# Patient Record
Sex: FEMALE | Race: WHITE | Employment: FULL TIME | URBAN - METROPOLITAN AREA
[De-identification: names, ages, dates, MRNs, and addresses within clinical notes are randomized per-mention and may not be internally consistent; named-entity substitution may affect disease eponyms.]

---

## 2017-03-14 ENCOUNTER — OFFICE VISIT (OUTPATIENT)
Dept: FAMILY MEDICINE CLINIC | Age: 22
End: 2017-03-14
Payer: COMMERCIAL

## 2017-03-14 VITALS
SYSTOLIC BLOOD PRESSURE: 108 MMHG | HEIGHT: 61 IN | HEART RATE: 86 BPM | DIASTOLIC BLOOD PRESSURE: 71 MMHG | RESPIRATION RATE: 16 BRPM | WEIGHT: 143.6 LBS | OXYGEN SATURATION: 98 % | BODY MASS INDEX: 27.11 KG/M2

## 2017-03-14 DIAGNOSIS — L30.9 ECZEMA, UNSPECIFIED TYPE: Primary | ICD-10-CM

## 2017-03-14 PROCEDURE — 99213 OFFICE O/P EST LOW 20 MIN: CPT | Performed by: FAMILY MEDICINE

## 2017-03-14 RX ORDER — CALCIPOTRIENE 50 UG/G
CREAM TOPICAL
Qty: 1 TUBE | Refills: 4 | Status: SHIPPED | OUTPATIENT
Start: 2017-03-14 | End: 2020-08-05

## 2017-08-21 ENCOUNTER — OFFICE VISIT (OUTPATIENT)
Dept: FAMILY MEDICINE CLINIC | Age: 22
End: 2017-08-21
Payer: COMMERCIAL

## 2017-08-21 ENCOUNTER — OFFICE VISIT (OUTPATIENT)
Dept: OBGYN CLINIC | Age: 22
End: 2017-08-21
Payer: COMMERCIAL

## 2017-08-21 VITALS
SYSTOLIC BLOOD PRESSURE: 116 MMHG | WEIGHT: 138 LBS | DIASTOLIC BLOOD PRESSURE: 73 MMHG | BODY MASS INDEX: 26.06 KG/M2 | HEART RATE: 80 BPM | HEIGHT: 61 IN | OXYGEN SATURATION: 96 %

## 2017-08-21 VITALS
WEIGHT: 138.8 LBS | DIASTOLIC BLOOD PRESSURE: 72 MMHG | BODY MASS INDEX: 27.25 KG/M2 | SYSTOLIC BLOOD PRESSURE: 107 MMHG | HEIGHT: 60 IN | HEART RATE: 72 BPM

## 2017-08-21 DIAGNOSIS — D22.71: Primary | ICD-10-CM

## 2017-08-21 DIAGNOSIS — Z30.09 CONTRACEPTIVE EDUCATION: Primary | ICD-10-CM

## 2017-08-21 DIAGNOSIS — Z13.31 POSITIVE DEPRESSION SCREENING: ICD-10-CM

## 2017-08-21 PROCEDURE — 99385 PREV VISIT NEW AGE 18-39: CPT | Performed by: ADVANCED PRACTICE MIDWIFE

## 2017-08-21 PROCEDURE — G8431 POS CLIN DEPRES SCRN F/U DOC: HCPCS | Performed by: FAMILY MEDICINE

## 2017-08-21 PROCEDURE — 99213 OFFICE O/P EST LOW 20 MIN: CPT | Performed by: FAMILY MEDICINE

## 2017-08-21 RX ORDER — NORGESTIMATE AND ETHINYL ESTRADIOL 0.25-0.035
1 KIT ORAL DAILY
Qty: 1 PACKET | Refills: 4 | Status: SHIPPED | OUTPATIENT
Start: 2017-08-21 | End: 2019-02-28 | Stop reason: CLARIF

## 2017-08-21 ASSESSMENT — PATIENT HEALTH QUESTIONNAIRE - PHQ9
7. TROUBLE CONCENTRATING ON THINGS, SUCH AS READING THE NEWSPAPER OR WATCHING TELEVISION: 3
SUM OF ALL RESPONSES TO PHQ QUESTIONS 1-9: 12
8. MOVING OR SPEAKING SO SLOWLY THAT OTHER PEOPLE COULD HAVE NOTICED. OR THE OPPOSITE, BEING SO FIGETY OR RESTLESS THAT YOU HAVE BEEN MOVING AROUND A LOT MORE THAN USUAL: 1
10. IF YOU CHECKED OFF ANY PROBLEMS, HOW DIFFICULT HAVE THESE PROBLEMS MADE IT FOR YOU TO DO YOUR WORK, TAKE CARE OF THINGS AT HOME, OR GET ALONG WITH OTHER PEOPLE: 2
4. FEELING TIRED OR HAVING LITTLE ENERGY: 3
5. POOR APPETITE OR OVEREATING: 3
2. FEELING DOWN, DEPRESSED OR HOPELESS: 2
SUM OF ALL RESPONSES TO PHQ9 QUESTIONS 1 & 2: 2
9. THOUGHTS THAT YOU WOULD BE BETTER OFF DEAD, OR OF HURTING YOURSELF: 0

## 2017-10-31 DIAGNOSIS — Z30.09 CONTRACEPTIVE EDUCATION: Primary | ICD-10-CM

## 2017-10-31 RX ORDER — LEVONORGESTREL AND ETHINYL ESTRADIOL 0.15-0.03
1 KIT ORAL DAILY
Qty: 1 PACKET | Refills: 3 | Status: SHIPPED | OUTPATIENT
Start: 2017-10-31 | End: 2017-11-16 | Stop reason: SDUPTHER

## 2017-11-16 DIAGNOSIS — Z30.09 CONTRACEPTIVE EDUCATION: ICD-10-CM

## 2017-11-16 RX ORDER — LEVONORGESTREL AND ETHINYL ESTRADIOL 0.15-0.03
1 KIT ORAL DAILY
Qty: 3 PACKET | Refills: 3 | Status: SHIPPED | OUTPATIENT
Start: 2017-11-16 | End: 2018-11-22 | Stop reason: SDUPTHER

## 2017-12-30 DIAGNOSIS — L30.9 ECZEMA, UNSPECIFIED TYPE: Primary | ICD-10-CM

## 2017-12-30 RX ORDER — PIMECROLIMUS 10 MG/G
CREAM TOPICAL
Qty: 100 G | Refills: 1 | Status: ON HOLD | OUTPATIENT
Start: 2017-12-30 | End: 2020-11-26

## 2018-01-11 ENCOUNTER — TELEPHONE (OUTPATIENT)
Dept: FAMILY MEDICINE CLINIC | Age: 23
End: 2018-01-11

## 2018-05-03 ENCOUNTER — OFFICE VISIT (OUTPATIENT)
Dept: FAMILY MEDICINE CLINIC | Age: 23
End: 2018-05-03
Payer: COMMERCIAL

## 2018-05-03 VITALS
WEIGHT: 140 LBS | RESPIRATION RATE: 16 BRPM | TEMPERATURE: 99.1 F | HEART RATE: 79 BPM | BODY MASS INDEX: 27.48 KG/M2 | HEIGHT: 60 IN | SYSTOLIC BLOOD PRESSURE: 132 MMHG | DIASTOLIC BLOOD PRESSURE: 83 MMHG

## 2018-05-03 DIAGNOSIS — J06.9 VIRAL URI: ICD-10-CM

## 2018-05-03 DIAGNOSIS — J02.9 SORE THROAT: Primary | ICD-10-CM

## 2018-05-03 DIAGNOSIS — R49.0 HOARSENESS OF VOICE: ICD-10-CM

## 2018-05-03 LAB — S PYO AG THROAT QL: NORMAL

## 2018-05-03 PROCEDURE — 87880 STREP A ASSAY W/OPTIC: CPT | Performed by: FAMILY MEDICINE

## 2018-05-03 PROCEDURE — 99213 OFFICE O/P EST LOW 20 MIN: CPT | Performed by: FAMILY MEDICINE

## 2018-05-03 RX ORDER — ARIPIPRAZOLE 2 MG/1
TABLET ORAL
COMMUNITY
Start: 2018-02-16 | End: 2019-09-25

## 2018-05-03 RX ORDER — LAMOTRIGINE 25 MG/1
150 TABLET ORAL
COMMUNITY
Start: 2018-04-20 | End: 2019-02-28 | Stop reason: DRUGHIGH

## 2018-05-03 RX ORDER — AZITHROMYCIN 250 MG/1
TABLET, FILM COATED ORAL
Qty: 6 TABLET | Refills: 0 | Status: SHIPPED | OUTPATIENT
Start: 2018-05-03 | End: 2018-05-13

## 2018-11-22 DIAGNOSIS — Z30.09 CONTRACEPTIVE EDUCATION: ICD-10-CM

## 2018-11-23 RX ORDER — LEVONORGESTREL AND ETHINYL ESTRADIOL 0.15-0.03
KIT ORAL
Qty: 91 TABLET | Refills: 2 | Status: SHIPPED | OUTPATIENT
Start: 2018-11-23 | End: 2019-09-20 | Stop reason: SDUPTHER

## 2019-02-28 ENCOUNTER — OFFICE VISIT (OUTPATIENT)
Dept: FAMILY MEDICINE CLINIC | Age: 24
End: 2019-02-28
Payer: COMMERCIAL

## 2019-02-28 VITALS
HEIGHT: 60 IN | DIASTOLIC BLOOD PRESSURE: 72 MMHG | TEMPERATURE: 97 F | OXYGEN SATURATION: 99 % | BODY MASS INDEX: 27.68 KG/M2 | WEIGHT: 141 LBS | HEART RATE: 89 BPM | SYSTOLIC BLOOD PRESSURE: 118 MMHG

## 2019-02-28 DIAGNOSIS — Z23 NEED FOR MENINGOCOCCAL VACCINATION: ICD-10-CM

## 2019-02-28 DIAGNOSIS — R09.82 POSTNASAL DRIP: Primary | ICD-10-CM

## 2019-02-28 DIAGNOSIS — Z23 NEED FOR TDAP VACCINATION: ICD-10-CM

## 2019-02-28 DIAGNOSIS — S83.421A SPRAIN OF LATERAL COLLATERAL LIGAMENT OF RIGHT KNEE, INITIAL ENCOUNTER: ICD-10-CM

## 2019-02-28 PROCEDURE — 90471 IMMUNIZATION ADMIN: CPT | Performed by: FAMILY MEDICINE

## 2019-02-28 PROCEDURE — 90734 MENACWYD/MENACWYCRM VACC IM: CPT | Performed by: FAMILY MEDICINE

## 2019-02-28 PROCEDURE — 99213 OFFICE O/P EST LOW 20 MIN: CPT | Performed by: FAMILY MEDICINE

## 2019-02-28 RX ORDER — CETIRIZINE HYDROCHLORIDE 10 MG/1
10 TABLET ORAL DAILY
Qty: 30 TABLET | Refills: 5 | Status: SHIPPED | OUTPATIENT
Start: 2019-02-28 | End: 2019-08-31 | Stop reason: SDUPTHER

## 2019-02-28 RX ORDER — MOMETASONE FUROATE 50 UG/1
2 SPRAY, METERED NASAL DAILY
Qty: 1 INHALER | Refills: 3 | Status: SHIPPED | OUTPATIENT
Start: 2019-02-28 | End: 2019-09-25

## 2019-02-28 RX ORDER — LAMOTRIGINE 200 MG/1
150 TABLET ORAL DAILY
Status: ON HOLD | COMMUNITY
Start: 2019-02-26 | End: 2020-11-26

## 2019-02-28 RX ORDER — MULTIVIT-MIN/IRON/FOLIC ACID/K 18-600-40
1 CAPSULE ORAL DAILY
Status: ON HOLD | COMMUNITY
End: 2020-11-26

## 2019-02-28 ASSESSMENT — PATIENT HEALTH QUESTIONNAIRE - PHQ9
2. FEELING DOWN, DEPRESSED OR HOPELESS: 0
SUM OF ALL RESPONSES TO PHQ9 QUESTIONS 1 & 2: 0
SUM OF ALL RESPONSES TO PHQ QUESTIONS 1-9: 0
1. LITTLE INTEREST OR PLEASURE IN DOING THINGS: 0
SUM OF ALL RESPONSES TO PHQ QUESTIONS 1-9: 0

## 2019-03-04 ENCOUNTER — TELEPHONE (OUTPATIENT)
Dept: FAMILY MEDICINE CLINIC | Age: 24
End: 2019-03-04

## 2019-03-05 RX ORDER — OSELTAMIVIR PHOSPHATE 75 MG/1
75 CAPSULE ORAL DAILY
Qty: 7 CAPSULE | Refills: 0 | Status: SHIPPED | OUTPATIENT
Start: 2019-03-05 | End: 2019-03-12

## 2019-03-06 RX ORDER — AMOXICILLIN 500 MG/1
500 CAPSULE ORAL 2 TIMES DAILY
Qty: 10 CAPSULE | Refills: 0 | Status: SHIPPED | OUTPATIENT
Start: 2019-03-06 | End: 2019-03-11

## 2019-07-12 ENCOUNTER — TELEPHONE (OUTPATIENT)
Dept: FAMILY MEDICINE CLINIC | Age: 24
End: 2019-07-12

## 2019-07-12 DIAGNOSIS — R14.0 BLOATING: Primary | ICD-10-CM

## 2019-07-12 DIAGNOSIS — R14.3 EXCESSIVE GAS: ICD-10-CM

## 2019-08-31 DIAGNOSIS — R09.82 POSTNASAL DRIP: ICD-10-CM

## 2019-09-03 RX ORDER — CETIRIZINE HYDROCHLORIDE 10 MG/1
TABLET ORAL
Qty: 30 TABLET | Refills: 4 | Status: SHIPPED | OUTPATIENT
Start: 2019-09-03 | End: 2020-02-13

## 2019-09-17 ENCOUNTER — TELEPHONE (OUTPATIENT)
Dept: FAMILY MEDICINE CLINIC | Age: 24
End: 2019-09-17

## 2019-09-20 DIAGNOSIS — Z30.09 CONTRACEPTIVE EDUCATION: ICD-10-CM

## 2019-09-20 RX ORDER — LEVONORGESTREL AND ETHINYL ESTRADIOL 0.15-0.03
1 KIT ORAL DAILY
Qty: 91 TABLET | Refills: 2 | Status: SHIPPED | OUTPATIENT
Start: 2019-09-20 | End: 2020-06-08

## 2019-09-25 ENCOUNTER — OFFICE VISIT (OUTPATIENT)
Dept: FAMILY MEDICINE CLINIC | Age: 24
End: 2019-09-25
Payer: COMMERCIAL

## 2019-09-25 VITALS
DIASTOLIC BLOOD PRESSURE: 89 MMHG | WEIGHT: 130 LBS | SYSTOLIC BLOOD PRESSURE: 122 MMHG | OXYGEN SATURATION: 99 % | TEMPERATURE: 97.9 F | BODY MASS INDEX: 25.39 KG/M2 | HEART RATE: 96 BPM

## 2019-09-25 DIAGNOSIS — K50.10 CROHN'S DISEASE OF COLON WITHOUT COMPLICATION (HCC): Primary | ICD-10-CM

## 2019-09-25 DIAGNOSIS — Z23 NEED FOR PNEUMOCOCCAL VACCINATION: ICD-10-CM

## 2019-09-25 DIAGNOSIS — Z23 NEED FOR HPV VACCINATION: ICD-10-CM

## 2019-09-25 DIAGNOSIS — Z23 NEED FOR INFLUENZA VACCINATION: ICD-10-CM

## 2019-09-25 DIAGNOSIS — Z23 NEED FOR MENINGOCOCCAL VACCINATION: ICD-10-CM

## 2019-09-25 PROCEDURE — 99213 OFFICE O/P EST LOW 20 MIN: CPT | Performed by: FAMILY MEDICINE

## 2019-09-25 PROCEDURE — 90471 IMMUNIZATION ADMIN: CPT | Performed by: FAMILY MEDICINE

## 2019-09-25 PROCEDURE — 90686 IIV4 VACC NO PRSV 0.5 ML IM: CPT | Performed by: FAMILY MEDICINE

## 2019-09-25 PROCEDURE — 90472 IMMUNIZATION ADMIN EACH ADD: CPT | Performed by: FAMILY MEDICINE

## 2019-09-25 PROCEDURE — 90734 MENACWYD/MENACWYCRM VACC IM: CPT | Performed by: FAMILY MEDICINE

## 2019-09-25 RX ORDER — AZATHIOPRINE 50 MG/1
50 TABLET ORAL DAILY
Status: ON HOLD | COMMUNITY
Start: 2019-09-17 | End: 2020-11-26

## 2019-09-25 RX ORDER — BUDESONIDE 3 MG/1
CAPSULE, COATED PELLETS ORAL
Status: ON HOLD | COMMUNITY
Start: 2019-09-17 | End: 2020-06-01 | Stop reason: HOSPADM

## 2019-10-11 ENCOUNTER — HOSPITAL ENCOUNTER (EMERGENCY)
Facility: CLINIC | Age: 24
Discharge: HOME | End: 2019-10-11
Attending: EMERGENCY MEDICINE
Payer: COMMERCIAL

## 2019-10-11 VITALS
HEART RATE: 84 BPM | SYSTOLIC BLOOD PRESSURE: 117 MMHG | RESPIRATION RATE: 18 BRPM | OXYGEN SATURATION: 98 % | DIASTOLIC BLOOD PRESSURE: 84 MMHG | TEMPERATURE: 98.3 F

## 2019-10-11 DIAGNOSIS — S40.211A ABRASION OF RIGHT SHOULDER, INITIAL ENCOUNTER: ICD-10-CM

## 2019-10-11 DIAGNOSIS — S00.83XA CONTUSION OF FOREHEAD, INITIAL ENCOUNTER: ICD-10-CM

## 2019-10-11 DIAGNOSIS — S09.90XA CLOSED HEAD INJURY, INITIAL ENCOUNTER: Primary | ICD-10-CM

## 2019-10-11 PROCEDURE — 99282 EMERGENCY DEPT VISIT SF MDM: CPT

## 2019-10-11 ASSESSMENT — PAIN DESCRIPTION - PAIN TYPE: TYPE: ACUTE PAIN

## 2019-10-11 ASSESSMENT — PAIN DESCRIPTION - LOCATION: LOCATION: HEAD

## 2019-10-11 ASSESSMENT — PAIN SCALES - GENERAL: PAINLEVEL_OUTOF10: 4

## 2019-10-24 ENCOUNTER — NURSE ONLY (OUTPATIENT)
Dept: FAMILY MEDICINE CLINIC | Age: 24
End: 2019-10-24
Payer: COMMERCIAL

## 2019-10-24 DIAGNOSIS — Z23 NEED FOR HEPATITIS B VACCINATION: Primary | ICD-10-CM

## 2019-10-24 DIAGNOSIS — Z23 NEED FOR HPV VACCINATION: ICD-10-CM

## 2019-10-24 DIAGNOSIS — Z23 NEED FOR PNEUMOCOCCAL VACCINATION: ICD-10-CM

## 2019-10-24 PROCEDURE — 90471 IMMUNIZATION ADMIN: CPT | Performed by: FAMILY MEDICINE

## 2019-10-24 PROCEDURE — 90746 HEPB VACCINE 3 DOSE ADULT IM: CPT | Performed by: FAMILY MEDICINE

## 2019-10-24 PROCEDURE — 90472 IMMUNIZATION ADMIN EACH ADD: CPT | Performed by: FAMILY MEDICINE

## 2019-10-24 PROCEDURE — 90651 9VHPV VACCINE 2/3 DOSE IM: CPT | Performed by: FAMILY MEDICINE

## 2019-11-13 ENCOUNTER — OFFICE VISIT (OUTPATIENT)
Dept: OBGYN CLINIC | Age: 24
End: 2019-11-13
Payer: COMMERCIAL

## 2019-11-13 ENCOUNTER — HOSPITAL ENCOUNTER (OUTPATIENT)
Age: 24
Setting detail: SPECIMEN
Discharge: HOME | End: 2019-11-13
Payer: COMMERCIAL

## 2019-11-13 VITALS
BODY MASS INDEX: 25.36 KG/M2 | WEIGHT: 129.19 LBS | HEART RATE: 73 BPM | HEIGHT: 60 IN | SYSTOLIC BLOOD PRESSURE: 116 MMHG | DIASTOLIC BLOOD PRESSURE: 73 MMHG

## 2019-11-13 DIAGNOSIS — Z11.3 SCREENING EXAMINATION FOR STD (SEXUALLY TRANSMITTED DISEASE): ICD-10-CM

## 2019-11-13 DIAGNOSIS — Z01.419 WELL WOMAN EXAM WITH ROUTINE GYNECOLOGICAL EXAM: Primary | ICD-10-CM

## 2019-11-13 PROCEDURE — 99395 PREV VISIT EST AGE 18-39: CPT | Performed by: OBSTETRICS & GYNECOLOGY

## 2019-11-14 LAB
DIRECT EXAM: NORMAL
Lab: NORMAL
SPECIMEN DESCRIPTION: NORMAL

## 2019-11-15 LAB
C TRACH DNA GENITAL QL NAA+PROBE: NEGATIVE
N. GONORRHOEAE DNA: NEGATIVE
SPECIMEN DESCRIPTION: NORMAL

## 2019-11-19 LAB — CYTOLOGY REPORT: NORMAL

## 2019-11-25 ENCOUNTER — NURSE ONLY (OUTPATIENT)
Dept: FAMILY MEDICINE CLINIC | Age: 24
End: 2019-11-25
Payer: COMMERCIAL

## 2019-11-25 DIAGNOSIS — Z23 NEED FOR HEPATITIS B VACCINATION: Primary | ICD-10-CM

## 2019-11-25 PROCEDURE — 90471 IMMUNIZATION ADMIN: CPT | Performed by: FAMILY MEDICINE

## 2019-11-25 PROCEDURE — 90746 HEPB VACCINE 3 DOSE ADULT IM: CPT | Performed by: FAMILY MEDICINE

## 2020-02-13 RX ORDER — CETIRIZINE HYDROCHLORIDE 10 MG/1
TABLET ORAL
Qty: 30 TABLET | Refills: 3 | Status: SHIPPED | OUTPATIENT
Start: 2020-02-13 | End: 2020-03-03 | Stop reason: SDUPTHER

## 2020-03-03 ENCOUNTER — TELEPHONE (OUTPATIENT)
Dept: FAMILY MEDICINE CLINIC | Age: 25
End: 2020-03-03

## 2020-03-03 RX ORDER — CETIRIZINE HYDROCHLORIDE 10 MG/1
10 TABLET ORAL DAILY
Qty: 30 TABLET | Refills: 3 | Status: ON HOLD | OUTPATIENT
Start: 2020-03-03 | End: 2020-11-26

## 2020-03-03 NOTE — TELEPHONE ENCOUNTER
Charmayne Plough is calling to request a refill on the following medication(s):    Last Visit Date (If Applicable):  7/37/3652    Next Visit Date:    Visit date not found    Medication Request:  Requested Prescriptions     Pending Prescriptions Disp Refills    cetirizine (ZYRTEC) 10 MG tablet 30 tablet 3

## 2020-03-27 ENCOUNTER — TELEPHONE (OUTPATIENT)
Dept: FAMILY MEDICINE CLINIC | Age: 25
End: 2020-03-27

## 2020-03-27 NOTE — TELEPHONE ENCOUNTER
Pt job is making her work during this [id number] and pt states she has a compromised immune system and wants to know if she should self isolate and if so she needs a letter from her PCP.

## 2020-04-21 ENCOUNTER — TELEPHONE (OUTPATIENT)
Dept: FAMILY MEDICINE CLINIC | Age: 25
End: 2020-04-21

## 2020-04-21 NOTE — TELEPHONE ENCOUNTER
Patient calling because she was taking off work until April 27,2020 patient states she would like know if she should refrain from work longer due to the covid and would like a new letter please advise.

## 2020-04-22 ENCOUNTER — TELEPHONE (OUTPATIENT)
Dept: FAMILY MEDICINE CLINIC | Age: 25
End: 2020-04-22

## 2020-04-22 ENCOUNTER — TELEMEDICINE (OUTPATIENT)
Dept: FAMILY MEDICINE CLINIC | Age: 25
End: 2020-04-22
Payer: COMMERCIAL

## 2020-04-22 PROBLEM — K50.90 CROHN'S DISEASE WITHOUT COMPLICATION (HCC): Status: ACTIVE | Noted: 2020-04-22

## 2020-04-22 PROCEDURE — 99213 OFFICE O/P EST LOW 20 MIN: CPT | Performed by: FAMILY MEDICINE

## 2020-04-22 NOTE — LETTER
[address]   [place] [address] [address]  Phone: [phone]  Fax: [phone]    Faustina Cheema MD        April 23, 2020      Ji Moreno, JNX:[birth year], is under my medical care. Due to the COVID-19 pandemic, I recommend she stay home from work due to a compromised immune system. She may return to work on 05/25/2020. If you have any questions please contact my office at the number above.        Sincerely,        Zoe Jovel CMA-Darrin Cheema MD

## 2020-04-22 NOTE — PROGRESS NOTES
General FM note    Dread Mercado is a 27 y.o. female who presents today for follow up on her  medical conditions as noted below. Dread Mercado is c/o of No chief complaint on file. Patient Active Problem List:     Contraceptive education     Eczema     Past Medical History:   Diagnosis Date    Anxiety     Crohn's disease of ileum ([address]     Depression     Seasonal allergies       Past Surgical History:   Procedure Laterality Date    COLONOSCOPY  09/16/2019     Family History   Problem Relation Age of Onset    Bipolar Disorder Mother     Other Mother     Alcohol Abuse Mother     Alcohol Abuse Maternal Aunt     Bipolar Disorder Maternal Grandfather     Diabetes Maternal Grandfather     Alcohol Abuse Maternal Grandfather     High Blood Pressure Maternal Grandfather     Stroke Maternal Grandfather     Miscarriages / Stillbirths Neg Hx     Coronary Art Dis Neg Hx     Colon Cancer Neg Hx     Uterine Cancer Neg Hx     Ovarian Cancer Neg Hx     Breast Cancer Neg Hx      Current Outpatient Medications   Medication Sig Dispense Refill    cetirizine (ZYRTEC) 10 MG tablet Take 1 tablet by mouth daily 30 tablet 3    azaTHIOprine (IMURAN) 50 MG tablet       budesonide (ENTOCORT EC) 3 MG extended release capsule       levonorgestrel-ethinyl estradiol (INTROVALE) 0.15-0.03 MG per tablet Take 1 tablet by mouth daily 91 tablet 2    lamoTRIgine (LAMICTAL) 150 MG tablet 200 mg       Cholecalciferol (VITAMIN D) 2000 units CAPS capsule Take by mouth daily      Multiple Vitamins-Minerals (MULTIVITAMIN PO) Take by mouth      pimecrolimus (ELIDEL) 1 % cream Apply topically 2 times daily. 100 g 1    NONFORMULARY daily Indications: Flouride Toothpaste  - RX      calcipotriene (DOVONEX) 0.005 % cream Apply topically 2 times daily. (Patient not taking: Reported on 11/13/2019) 1 Tube 4    hydrocortisone 2.5 % cream Apply topically 2 times daily.  20 Tube 3     No current facility-administered medications for this visit. ALLERGIES:  No Known Allergies    Social History     Tobacco Use    Smoking status: Never Smoker    Smokeless tobacco: Never Used   Substance Use Topics    Alcohol use: No     Alcohol/week: 0.0 standard drinks      There is no height or weight on file to calculate BMI. There were no vitals taken for this visit. Subjective:      HPI    28 yo female reaching out per tele med visit. 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 Is on Humara and Imuran -- Crohn's. Feels that she is not safe going back. Next jonathan with GI: in 08/2020. Has some co-workers which are infected with COVID 23. Has been home. Needs to have a letter for 4 weeks. Review of Systems   Pertinent items are noted in HPI. Objective:   Physical Exam  General appearance: normal development, habitus and attention, no deformities. No distress. Pulmo: normal breathing pattern. Psychiatric: alert and oriented to place, time and person. Normal mood and affect. Assessment:       Diagnosis Orders   1. Crohn's disease without complication, unspecified gastrointestinal tract location ([facility]     2. Immunosuppressed status ([facility]         Plan:   Pt is on immunosuppressing meds. Needs to stay home. She works in a clinic environment where she could get easily infected. Call or return to clinic prn if these symptoms worsen or fail to improve as anticipated. I have reviewed the instructions with the patient, answering all questions to her satisfaction. Faheem Ramirez is a 27 y.o. female being evaluated by a Virtual Visit (video visit) encounter to address concerns as mentioned above. A caregiver was present when appropriate. Due to this being a TeleHealth encounter (During AHAZV-08 public health emergency), evaluation of the following organ systems was limited: Vitals/Constitutional/EENT/Resp/CV/GI//MS/Neuro/Skin/Heme-Lymph-Imm.   Pursuant to the emergency declaration under the [address]

## 2020-05-11 ENCOUNTER — PATIENT MESSAGE (OUTPATIENT)
Dept: FAMILY MEDICINE CLINIC | Age: 25
End: 2020-05-11

## 2020-05-12 ENCOUNTER — TELEPHONE (OUTPATIENT)
Dept: FAMILY MEDICINE CLINIC | Age: 25
End: 2020-05-12

## 2020-05-12 ENCOUNTER — E-VISIT (OUTPATIENT)
Dept: FAMILY MEDICINE CLINIC | Age: 25
End: 2020-05-12
Payer: COMMERCIAL

## 2020-05-12 PROCEDURE — 99421 OL DIG E/M SVC 5-10 MIN: CPT | Performed by: FAMILY MEDICINE

## 2020-05-12 RX ORDER — BACITRACIN 500 [USP'U]/G
OINTMENT OPHTHALMIC
Qty: 3.5 G | Refills: 1 | Status: SHIPPED | OUTPATIENT
Start: 2020-05-12 | End: 2020-05-13

## 2020-05-12 NOTE — PROGRESS NOTES
Patient is reaching out today because of a rash which she started to have 3 weeks ago. That comes and goes sometimes itches it is on her cheeks bridge of her nose forehead. She started a different soap but even after stopping the soap the rash still persisted. Gets worse when she is in the shower or when she puts any soap or moisturizer on it. Never had a similar rash before. I will call in a topical.  We will treated like a staph infection but I also will order additional blood work including the lupus work-up. The patient has a history of Crohn's disease. Treated by a gastroenterologist.  Please asked the gastroenterologist if this could be a side effect from the current medication but it is not. I called the patient and discussed with her my diagnoses including staph infection but also more so discoid erythema which would lead to lupus diagnosis. Blood work was ordered.

## 2020-05-12 NOTE — TELEPHONE ENCOUNTER
Patient calling because she has rash on her and nose, patient is requesting medication or options over the counter medication she can buy.  Please Advise

## 2020-05-13 ENCOUNTER — TELEPHONE (OUTPATIENT)
Dept: FAMILY MEDICINE CLINIC | Age: 25
End: 2020-05-13

## 2020-05-20 ENCOUNTER — TELEMEDICINE (OUTPATIENT)
Dept: FAMILY MEDICINE CLINIC | Age: 25
End: 2020-05-20
Payer: COMMERCIAL

## 2020-05-20 ENCOUNTER — TELEPHONE (OUTPATIENT)
Dept: FAMILY MEDICINE CLINIC | Age: 25
End: 2020-05-20

## 2020-05-20 PROCEDURE — 99213 OFFICE O/P EST LOW 20 MIN: CPT | Performed by: FAMILY MEDICINE

## 2020-05-21 ASSESSMENT — PATIENT HEALTH QUESTIONNAIRE - PHQ9
SUM OF ALL RESPONSES TO PHQ QUESTIONS 1-9: 0
2. FEELING DOWN, DEPRESSED OR HOPELESS: 0
1. LITTLE INTEREST OR PLEASURE IN DOING THINGS: 0
SUM OF ALL RESPONSES TO PHQ QUESTIONS 1-9: 0
SUM OF ALL RESPONSES TO PHQ9 QUESTIONS 1 & 2: 0

## 2020-05-30 ENCOUNTER — APPOINTMENT (OUTPATIENT)
Dept: CT IMAGING | Facility: CLINIC | Age: 25
End: 2020-05-30
Payer: COMMERCIAL

## 2020-05-30 ENCOUNTER — HOSPITAL ENCOUNTER (EMERGENCY)
Facility: CLINIC | Age: 25
Discharge: ANOTHER HOSPITAL | End: 2020-05-30
Attending: SPECIALIST
Payer: COMMERCIAL

## 2020-05-30 ENCOUNTER — HOSPITAL ENCOUNTER (INPATIENT)
Age: 25
LOS: 2 days | Discharge: HOME | DRG: 386 | End: 2020-06-01
Attending: INTERNAL MEDICINE | Admitting: INTERNAL MEDICINE
Payer: COMMERCIAL

## 2020-05-30 VITALS
HEART RATE: 99 BPM | HEIGHT: 63 IN | OXYGEN SATURATION: 98 % | SYSTOLIC BLOOD PRESSURE: 111 MMHG | TEMPERATURE: 98.2 F | RESPIRATION RATE: 16 BRPM | BODY MASS INDEX: 22.66 KG/M2 | DIASTOLIC BLOOD PRESSURE: 94 MMHG | WEIGHT: 127.87 LBS

## 2020-05-30 PROBLEM — K56.609 SBO (SMALL BOWEL OBSTRUCTION) (HCC): Status: ACTIVE | Noted: 2020-05-30

## 2020-05-30 PROBLEM — K50.919 EXACERBATION OF CROHN'S DISEASE, UNSPECIFIED COMPLICATION (HCC): Status: ACTIVE | Noted: 2020-05-30

## 2020-05-30 LAB
ABSOLUTE EOS #: 0 K/UL (ref 0–0.4)
ABSOLUTE IMMATURE GRANULOCYTE: ABNORMAL K/UL (ref 0–0.3)
ABSOLUTE LYMPH #: 0.6 K/UL (ref 1–4.8)
ABSOLUTE MONO #: 0.6 K/UL (ref 0.1–1.2)
ALBUMIN SERPL-MCNC: 4.5 G/DL (ref 3.5–5.2)
ALBUMIN/GLOBULIN RATIO: 1.4 (ref 1–2.5)
ALP BLD-CCNC: 44 U/L (ref 35–104)
ALT SERPL-CCNC: 14 U/L (ref 5–33)
ANION GAP SERPL CALCULATED.3IONS-SCNC: 13 MMOL/L (ref 9–17)
AST SERPL-CCNC: 17 U/L
BASOPHILS # BLD: 0 % (ref 0–2)
BASOPHILS ABSOLUTE: 0 K/UL (ref 0–0.2)
BILIRUB SERPL-MCNC: 0.5 MG/DL (ref 0.3–1.2)
BILIRUBIN URINE: NEGATIVE
BUN BLDV-MCNC: 14 MG/DL (ref 6–20)
BUN/CREAT BLD: ABNORMAL (ref 9–20)
CALCIUM SERPL-MCNC: 9.8 MG/DL (ref 8.6–10.4)
CHLORIDE BLD-SCNC: 103 MMOL/L (ref 98–107)
CO2: 24 MMOL/L (ref 20–31)
COLOR: YELLOW
COMMENT UA: ABNORMAL
CREAT SERPL-MCNC: 0.6 MG/DL (ref 0.5–0.9)
DIFFERENTIAL TYPE: ABNORMAL
EOSINOPHILS RELATIVE PERCENT: 0 % (ref 1–4)
GFR AFRICAN AMERICAN: >60 ML/MIN
GFR NON-AFRICAN AMERICAN: >60 ML/MIN
GFR SERPL CREATININE-BSD FRML MDRD: ABNORMAL ML/MIN/{1.73_M2}
GFR SERPL CREATININE-BSD FRML MDRD: ABNORMAL ML/MIN/{1.73_M2}
GLUCOSE BLD-MCNC: 125 MG/DL (ref 70–99)
GLUCOSE URINE: NEGATIVE
HCG QUALITATIVE: NEGATIVE
HCT VFR BLD CALC: 45.9 % (ref 36–46)
HEMOGLOBIN: 15.4 G/DL (ref 12–16)
IMMATURE GRANULOCYTES: ABNORMAL %
KETONES, URINE: ABNORMAL
LACTIC ACID: 2.2 MMOL/L (ref 0.5–2.2)
LEUKOCYTE ESTERASE, URINE: NEGATIVE
LIPASE: 28 U/L (ref 13–60)
LYMPHOCYTES # BLD: 4 % (ref 24–44)
MCH RBC QN AUTO: 30.9 PG (ref 26–34)
MCHC RBC AUTO-ENTMCNC: 33.6 G/DL (ref 31–37)
MCV RBC AUTO: 92 FL (ref 80–100)
MONOCYTES # BLD: 4 % (ref 2–11)
NITRITE, URINE: NEGATIVE
NRBC AUTOMATED: ABNORMAL PER 100 WBC
PDW BLD-RTO: 12.7 % (ref 12.5–15.4)
PH UA: 5.5 (ref 5–8)
PLATELET # BLD: 430 K/UL (ref 140–450)
PLATELET ESTIMATE: ABNORMAL
PMV BLD AUTO: 6.6 FL (ref 6–12)
POTASSIUM SERPL-SCNC: 4.5 MMOL/L (ref 3.7–5.3)
PROTEIN UA: NEGATIVE
RBC # BLD: 4.98 M/UL (ref 4–5.2)
RBC # BLD: ABNORMAL 10*6/UL
SEDIMENTATION RATE, ERYTHROCYTE: 19 MM (ref 0–20)
SEG NEUTROPHILS: 92 % (ref 36–66)
SEGMENTED NEUTROPHILS ABSOLUTE COUNT: 16.2 K/UL (ref 1.8–7.7)
SODIUM BLD-SCNC: 140 MMOL/L (ref 135–144)
SPECIFIC GRAVITY UA: 1.1 (ref 1–1.03)
TOTAL PROTEIN: 7.8 G/DL (ref 6.4–8.3)
TURBIDITY: CLEAR
URINE HGB: NEGATIVE
UROBILINOGEN, URINE: NORMAL
WBC # BLD: 17.5 K/UL (ref 3.5–11)
WBC # BLD: ABNORMAL 10*3/UL

## 2020-05-30 PROCEDURE — 99254 IP/OBS CNSLTJ NEW/EST MOD 60: CPT | Performed by: INTERNAL MEDICINE

## 2020-05-30 PROCEDURE — 86256 FLUORESCENT ANTIBODY TITER: CPT

## 2020-05-30 PROCEDURE — 2580000003 HC RX 258: Performed by: SPECIALIST

## 2020-05-30 PROCEDURE — 88346 IMFLUOR 1ST 1ANTB STAIN PX: CPT

## 2020-05-30 PROCEDURE — 6360000002 HC RX W HCPCS: Performed by: INTERNAL MEDICINE

## 2020-05-30 PROCEDURE — 84703 CHORIONIC GONADOTROPIN ASSAY: CPT

## 2020-05-30 PROCEDURE — 81003 URINALYSIS AUTO W/O SCOPE: CPT

## 2020-05-30 PROCEDURE — 83605 ASSAY OF LACTIC ACID: CPT

## 2020-05-30 PROCEDURE — 6360000002 HC RX W HCPCS: Performed by: SPECIALIST

## 2020-05-30 PROCEDURE — 96376 TX/PRO/DX INJ SAME DRUG ADON: CPT

## 2020-05-30 PROCEDURE — APPNB30 APP NON BILLABLE TIME 0-30 MINS: Performed by: NURSE PRACTITIONER

## 2020-05-30 PROCEDURE — 1200000000 HC SEMI PRIVATE

## 2020-05-30 PROCEDURE — 6360000002 HC RX W HCPCS: Performed by: NURSE PRACTITIONER

## 2020-05-30 PROCEDURE — 36415 COLL VENOUS BLD VENIPUNCTURE: CPT

## 2020-05-30 PROCEDURE — 96361 HYDRATE IV INFUSION ADD-ON: CPT

## 2020-05-30 PROCEDURE — 86255 FLUORESCENT ANTIBODY SCREEN: CPT

## 2020-05-30 PROCEDURE — 6370000000 HC RX 637 (ALT 250 FOR IP): Performed by: INTERNAL MEDICINE

## 2020-05-30 PROCEDURE — 88350 IMFLUOR EA ADDL 1ANTB STN PX: CPT

## 2020-05-30 PROCEDURE — 99285 EMERGENCY DEPT VISIT HI MDM: CPT

## 2020-05-30 PROCEDURE — 80053 COMPREHEN METABOLIC PANEL: CPT

## 2020-05-30 PROCEDURE — 81479 UNLISTED MOLECULAR PATHOLOGY: CPT

## 2020-05-30 PROCEDURE — G0379 DIRECT REFER HOSPITAL OBSERV: HCPCS

## 2020-05-30 PROCEDURE — G0378 HOSPITAL OBSERVATION PER HR: HCPCS

## 2020-05-30 PROCEDURE — 96374 THER/PROPH/DIAG INJ IV PUSH: CPT

## 2020-05-30 PROCEDURE — 86140 C-REACTIVE PROTEIN: CPT

## 2020-05-30 PROCEDURE — 85025 COMPLETE CBC W/AUTO DIFF WBC: CPT

## 2020-05-30 PROCEDURE — 74177 CT ABD & PELVIS W/CONTRAST: CPT

## 2020-05-30 PROCEDURE — 96375 TX/PRO/DX INJ NEW DRUG ADDON: CPT

## 2020-05-30 PROCEDURE — 6360000002 HC RX W HCPCS: Performed by: PHYSICIAN ASSISTANT

## 2020-05-30 PROCEDURE — 86671 FUNGUS NES ANTIBODY: CPT

## 2020-05-30 PROCEDURE — 6360000002 HC RX W HCPCS

## 2020-05-30 PROCEDURE — 2580000003 HC RX 258: Performed by: PHYSICIAN ASSISTANT

## 2020-05-30 PROCEDURE — 6360000004 HC RX CONTRAST MEDICATION: Performed by: SPECIALIST

## 2020-05-30 PROCEDURE — 96372 THER/PROPH/DIAG INJ SC/IM: CPT

## 2020-05-30 PROCEDURE — 99223 1ST HOSP IP/OBS HIGH 75: CPT | Performed by: INTERNAL MEDICINE

## 2020-05-30 PROCEDURE — 83690 ASSAY OF LIPASE: CPT

## 2020-05-30 PROCEDURE — 82397 CHEMILUMINESCENT ASSAY: CPT

## 2020-05-30 PROCEDURE — 85651 RBC SED RATE NONAUTOMATED: CPT

## 2020-05-30 PROCEDURE — 83520 IMMUNOASSAY QUANT NOS NONAB: CPT

## 2020-05-30 RX ORDER — SODIUM CHLORIDE 0.9 % (FLUSH) 0.9 %
10 SYRINGE (ML) INJECTION EVERY 12 HOURS SCHEDULED
Status: DISCONTINUED | OUTPATIENT
Start: 2020-05-30 | End: 2020-06-01 | Stop reason: HOSPADM

## 2020-05-30 RX ORDER — PROMETHAZINE HYDROCHLORIDE 25 MG/1
12.5 TABLET ORAL EVERY 6 HOURS PRN
Status: DISCONTINUED | OUTPATIENT
Start: 2020-05-30 | End: 2020-06-01 | Stop reason: HOSPADM

## 2020-05-30 RX ORDER — 0.9 % SODIUM CHLORIDE 0.9 %
1000 INTRAVENOUS SOLUTION INTRAVENOUS ONCE
Status: COMPLETED | OUTPATIENT
Start: 2020-05-30 | End: 2020-05-30

## 2020-05-30 RX ORDER — ONDANSETRON 2 MG/ML
4 INJECTION INTRAMUSCULAR; INTRAVENOUS EVERY 6 HOURS PRN
Status: DISCONTINUED | OUTPATIENT
Start: 2020-05-30 | End: 2020-06-01 | Stop reason: HOSPADM

## 2020-05-30 RX ORDER — ACETAMINOPHEN 325 MG/1
650 TABLET ORAL EVERY 6 HOURS PRN
Status: DISCONTINUED | OUTPATIENT
Start: 2020-05-30 | End: 2020-06-01 | Stop reason: HOSPADM

## 2020-05-30 RX ORDER — LEVONORGESTREL AND ETHINYL ESTRADIOL 0.15-0.03
1 KIT ORAL DAILY
Status: DISCONTINUED | OUTPATIENT
Start: 2020-05-30 | End: 2020-05-31

## 2020-05-30 RX ORDER — DICYCLOMINE HYDROCHLORIDE 10 MG/1
10 CAPSULE ORAL
Status: DISCONTINUED | OUTPATIENT
Start: 2020-05-30 | End: 2020-06-01 | Stop reason: HOSPADM

## 2020-05-30 RX ORDER — 0.9 % SODIUM CHLORIDE 0.9 %
70 INTRAVENOUS SOLUTION INTRAVENOUS ONCE
Status: COMPLETED | OUTPATIENT
Start: 2020-05-30 | End: 2020-05-30

## 2020-05-30 RX ORDER — POLYETHYLENE GLYCOL 3350 17 G/17G
17 POWDER, FOR SOLUTION ORAL DAILY PRN
Status: DISCONTINUED | OUTPATIENT
Start: 2020-05-30 | End: 2020-06-01 | Stop reason: HOSPADM

## 2020-05-30 RX ORDER — SODIUM CHLORIDE 9 MG/ML
INJECTION, SOLUTION INTRAVENOUS CONTINUOUS
Status: DISCONTINUED | OUTPATIENT
Start: 2020-05-30 | End: 2020-06-01 | Stop reason: HOSPADM

## 2020-05-30 RX ORDER — SODIUM CHLORIDE 0.9 % (FLUSH) 0.9 %
10 SYRINGE (ML) INJECTION PRN
Status: DISCONTINUED | OUTPATIENT
Start: 2020-05-30 | End: 2020-05-30 | Stop reason: HOSPADM

## 2020-05-30 RX ORDER — SODIUM CHLORIDE 9 MG/ML
1000 INJECTION, SOLUTION INTRAVENOUS CONTINUOUS
Status: DISCONTINUED | OUTPATIENT
Start: 2020-05-30 | End: 2020-05-30 | Stop reason: HOSPADM

## 2020-05-30 RX ORDER — ONDANSETRON 2 MG/ML
INJECTION INTRAMUSCULAR; INTRAVENOUS
Status: COMPLETED
Start: 2020-05-30 | End: 2020-05-30

## 2020-05-30 RX ORDER — METHYLPREDNISOLONE SODIUM SUCCINATE 40 MG/ML
40 INJECTION, POWDER, LYOPHILIZED, FOR SOLUTION INTRAMUSCULAR; INTRAVENOUS EVERY 12 HOURS
Status: DISCONTINUED | OUTPATIENT
Start: 2020-05-30 | End: 2020-05-31

## 2020-05-30 RX ORDER — AZATHIOPRINE 50 MG/1
50 TABLET ORAL DAILY
Status: DISCONTINUED | OUTPATIENT
Start: 2020-05-30 | End: 2020-06-01 | Stop reason: HOSPADM

## 2020-05-30 RX ORDER — DICYCLOMINE HYDROCHLORIDE 10 MG/1
CAPSULE ORAL
Status: ON HOLD | COMMUNITY
Start: 2019-07-30 | End: 2020-11-26

## 2020-05-30 RX ORDER — ONDANSETRON 2 MG/ML
4 INJECTION INTRAMUSCULAR; INTRAVENOUS ONCE
Status: COMPLETED | OUTPATIENT
Start: 2020-05-30 | End: 2020-05-30

## 2020-05-30 RX ORDER — ACETAMINOPHEN 650 MG/1
650 SUPPOSITORY RECTAL EVERY 6 HOURS PRN
Status: DISCONTINUED | OUTPATIENT
Start: 2020-05-30 | End: 2020-06-01 | Stop reason: HOSPADM

## 2020-05-30 RX ORDER — CALCIPOTRIENE 50 UG/G
CREAM TOPICAL 2 TIMES DAILY
Status: DISCONTINUED | OUTPATIENT
Start: 2020-05-30 | End: 2020-06-01 | Stop reason: HOSPADM

## 2020-05-30 RX ORDER — PIMECROLIMUS 10 MG/G
CREAM TOPICAL 2 TIMES DAILY
Status: DISCONTINUED | OUTPATIENT
Start: 2020-05-30 | End: 2020-06-01 | Stop reason: HOSPADM

## 2020-05-30 RX ORDER — SODIUM CHLORIDE 0.9 % (FLUSH) 0.9 %
10 SYRINGE (ML) INJECTION PRN
Status: DISCONTINUED | OUTPATIENT
Start: 2020-05-30 | End: 2020-06-01 | Stop reason: HOSPADM

## 2020-05-30 RX ORDER — BUDESONIDE 3 MG/1
3 CAPSULE, COATED PELLETS ORAL DAILY
Status: DISCONTINUED | OUTPATIENT
Start: 2020-05-30 | End: 2020-05-30

## 2020-05-30 RX ORDER — MORPHINE SULFATE 2 MG/ML
2 INJECTION, SOLUTION INTRAMUSCULAR; INTRAVENOUS ONCE
Status: COMPLETED | OUTPATIENT
Start: 2020-05-30 | End: 2020-05-30

## 2020-05-30 RX ORDER — LAMOTRIGINE 100 MG/1
200 TABLET ORAL DAILY
Status: DISCONTINUED | OUTPATIENT
Start: 2020-05-30 | End: 2020-06-01 | Stop reason: HOSPADM

## 2020-05-30 RX ADMIN — ONDANSETRON 4 MG: 2 INJECTION INTRAMUSCULAR; INTRAVENOUS at 02:08

## 2020-05-30 RX ADMIN — SODIUM CHLORIDE: 9 INJECTION, SOLUTION INTRAVENOUS at 07:16

## 2020-05-30 RX ADMIN — SODIUM CHLORIDE 1000 ML: 9 INJECTION, SOLUTION INTRAVENOUS at 02:08

## 2020-05-30 RX ADMIN — LAMOTRIGINE 200 MG: 100 TABLET ORAL at 13:18

## 2020-05-30 RX ADMIN — HYDROMORPHONE HYDROCHLORIDE 1 MG: 1 INJECTION, SOLUTION INTRAMUSCULAR; INTRAVENOUS; SUBCUTANEOUS at 04:42

## 2020-05-30 RX ADMIN — AZATHIOPRINE 50 MG: 50 TABLET ORAL at 13:16

## 2020-05-30 RX ADMIN — IOPAMIDOL 75 ML: 755 INJECTION, SOLUTION INTRAVENOUS at 02:43

## 2020-05-30 RX ADMIN — DICYCLOMINE HYDROCHLORIDE 10 MG: 10 CAPSULE ORAL at 17:17

## 2020-05-30 RX ADMIN — SODIUM CHLORIDE: 9 INJECTION, SOLUTION INTRAVENOUS at 13:19

## 2020-05-30 RX ADMIN — Medication 10 ML: at 02:44

## 2020-05-30 RX ADMIN — HYDROMORPHONE HYDROCHLORIDE 1 MG: 1 INJECTION, SOLUTION INTRAMUSCULAR; INTRAVENOUS; SUBCUTANEOUS at 03:20

## 2020-05-30 RX ADMIN — Medication 10 ML: at 09:15

## 2020-05-30 RX ADMIN — METHYLPREDNISOLONE SODIUM SUCCINATE 40 MG: 40 INJECTION, POWDER, FOR SOLUTION INTRAMUSCULAR; INTRAVENOUS at 14:12

## 2020-05-30 RX ADMIN — MORPHINE SULFATE 2 MG: 2 INJECTION, SOLUTION INTRAMUSCULAR; INTRAVENOUS at 02:08

## 2020-05-30 RX ADMIN — SODIUM CHLORIDE 70 ML: 9 INJECTION, SOLUTION INTRAVENOUS at 02:44

## 2020-05-30 RX ADMIN — HYDROMORPHONE HYDROCHLORIDE 1 MG: 1 INJECTION, SOLUTION INTRAMUSCULAR; INTRAVENOUS; SUBCUTANEOUS at 11:43

## 2020-05-30 RX ADMIN — ENOXAPARIN SODIUM 40 MG: 40 INJECTION SUBCUTANEOUS at 11:42

## 2020-05-30 ASSESSMENT — PAIN DESCRIPTION - LOCATION: LOCATION: ABDOMEN;GENERALIZED

## 2020-05-30 ASSESSMENT — PAIN SCALES - GENERAL
PAINLEVEL_OUTOF10: 6
PAINLEVEL_OUTOF10: 2
PAINLEVEL_OUTOF10: 7
PAINLEVEL_OUTOF10: 8
PAINLEVEL_OUTOF10: 4
PAINLEVEL_OUTOF10: 0

## 2020-05-30 ASSESSMENT — ENCOUNTER SYMPTOMS
VOMITING: 1
ABDOMINAL PAIN: 1
NAUSEA: 1

## 2020-05-30 ASSESSMENT — PAIN DESCRIPTION - PAIN TYPE: TYPE: CHRONIC PAIN

## 2020-05-30 ASSESSMENT — PAIN DESCRIPTION - DESCRIPTORS: DESCRIPTORS: CRAMPING

## 2020-05-30 ASSESSMENT — PAIN DESCRIPTION - FREQUENCY: FREQUENCY: CONTINUOUS

## 2020-05-30 NOTE — PLAN OF CARE
PRE CONSULT ROUNDING NOTE  HPI  27year old female with pmh of crohns disease anxiety depression who presented to the ED for nausea with emesis of food and mid abdominal pain for one day. She states that she see's Dr Shira Fajardo for crohns disease of the terminal ileum that was diagnosed in September of 2019. She is currently taking Humira and imuran. Has not had prior abdominal surgery or hospitalizations related to crohns. Has had non radiating centralized abdominal pain that is improved this am; she has not had further vomiting this am. No fevers chills. Last BM was yesterday and normal per pt. CT abd shows multifocal mucosal edema and thickening to the distal jejunal and ileal small bowel with narrowing, proximal small bowel dilation with ileus possible  distal SBO. WBC 17.5. She denies NSAID use and has not recently been on steroids. No c/o dysphagia odynophagia weight loss melena hematochezia hematemesis rash new joint pain or change in the bowel habits.      Endoscopy no egd states she had colonoscopy by dr peoples in 2019 for diagnosis (no report)  Family reports no hx of UC/crohns liver pancreatic colon or stomach cancer  Social no smoking no etoh or illicit drugs   /09   Pulse 96   Temp 98.4 °F (36.9 °C) (Oral)   Resp 16   Ht 5' 2\" (1.575 m)   Wt 128 lb 15.5 oz (58.5 kg)   SpO2 96%   BMI 23.59 kg/m²     ROS as above meds labs imaging and past medical records were reviewed    Exam  General Appearance: alert and oriented to person, place and time, well-developed and well-nourished, in no acute distress  Skin: warm and dry, no rash or erythema  Head: normocephalic and atraumatic  Eyes: pupils equal, round, and reactive to light, extraocular eye movements intact, conjunctivae normal  ENT: hearing grossly normal bilaterally  Neck: neck supple and non tender without mass, no thyromegaly or thyroid nodules, no cervical lymphadenopathy   Pulmonary/Chest: clear to auscultation bilaterally- no wheezes, rales or

## 2020-05-30 NOTE — ED NOTES
Bed: ER09  Expected date:   Expected time:   Means of arrival:   Comments:     Lg Douglas RN  05/30/20 1695

## 2020-05-30 NOTE — ED PROVIDER NOTES
116/78, Temp: 98.2 °F (36.8 °C), Pulse: 77, Resp: 16    Orders Placed This Encounter   Medications    0.9 % sodium chloride bolus    0.9 % sodium chloride infusion    morphine (PF) injection 2 mg    ondansetron (ZOFRAN) injection 4 mg    ondansetron (ZOFRAN) 4 MG/2ML injection     YeeNava: cabinet override    iopamidol (ISOVUE-370) 76 % injection 75 mL    0.9 % sodium chloride bolus    sodium chloride flush 0.9 % injection 10 mL    HYDROmorphone (DILAUDID) injection 1 mg    HYDROmorphone (DILAUDID) injection 1 mg       During the emergency department course, patient was given normal saline 2 L bolus followed by 125 mL/h infusion. She was also given morphine 2 mg and Zofran 4 mg IV push. Her nausea is resolved but she continues having abdominal pain. She was further given Dilaudid 1 mg IV push with transient relief of the pain and she required another Dilaudid 1 mg IV push. Patient will benefit from hospitalization and possibly consultation with gastroenterologist.  There are no beds/nursing staff available at [facility] and patient is agreeable to be transferred to [facility].  I discussed the case with Dr. Joaquín Robles who kindly accepted the patient to be admitted on progressive care unit. Patient has bed assignment and transfer arrangements are being made. I have reviewed the disposition diagnosis with the patient and or their family/guardian. I have answered their questions and given discharge instructions. They voiced understanding of these instructions and did not have any further questions or complaints. Re-evaluation Notes    Patient is resting comfortably and does not appear to be in any pain or distress at this time. She did not have any episode of vomiting during the ED stay. PROCEDURES:  None    FINAL IMPRESSION      1. Abdominal pain, unspecified abdominal location    2.  Crohn's disease of small intestine with complication ([address]

## 2020-05-30 NOTE — PROGRESS NOTES
Patient transferred from PCU to [address] [place] [address] at this time. Patient oriented to room, assessment complete. RN will continue to monitor.

## 2020-05-30 NOTE — PROGRESS NOTES
Per pt's request, RN called her PCP Dr. Jorden Sanabria to let her know the pt was admitted. RN updated physician on pt's admission reason, and let her know that pt is awaiting a GI consult. Dr. Sariah Murry will be on vacation next week, so to have pt follow up with anyone at the office if need be. Pt informed.

## 2020-05-30 NOTE — H&P
History and Physical Service  [facility] Internal Medicine    HISTORY AND PHYSICAL EXAMINATION            Date:   5/30/2020  Patient name:  Alberto Diallo  MRN:   [id number]  Account:  [de-identified]  YOB: 1995  PCP:    Eamon Toro MD  Code Status:    Full Code    Chief Complaint:     Nausea vomiting abdominal pain    History Obtained From:     patient    History of Present Illness: The patient is a 27 y.o. Non-/non  female who presents   61-year-old  lady who is been diagnosed with Crohn's disease less than a year ago has been seeing clinic gastroenterologist admitted for less than 24 hours of abdominal pain nausea vomiting no diarrhea not associated with fever or chills  CT scan done shows bowel thickening and small bowel obstruction     Past Medical History:     Past Medical History:   Diagnosis Date    Anxiety     [facility]um [facility]     Depression     Seasonal allergies         Past Surgical History:     Past Surgical History:   Procedure Laterality Date    COLONOSCOPY  09/16/2019        Medications Prior to Admission:     Prior to Admission medications    Medication Sig Start Date End Date Taking? Authorizing Provider   Probiotic Product (PROBIOTIC-10 PO)    Yes Historical Provider, MD   cetirizine (ZYRTEC) 10 MG tablet Take 1 tablet by mouth daily 3/3/20  Yes Eamon Toro MD   azaTHIOprine (IMURAN) 50 MG tablet  9/17/19  Yes Historical Provider, MD   levonorgestrel-ethinyl estradiol ([place]) 0.15-0.03 MG per tablet Take 1 tablet by mouth daily 9/20/19  Yes Eamon Toro MD   lamoTRIgine (LAMICTAL) 150 MG tablet 200 mg  2/26/19  Yes Historical Provider, MD   Cholecalciferol (VITAMIN D) 2000 units CAPS capsule Take by mouth daily   Yes Historical Provider, MD   Multiple Vitamins-Minerals (MULTIVITAMIN PO) Take by mouth   Yes Historical Provider, MD   pimecrolimus (ELIDEL) 1 % cream Apply topically 2 times daily.  12/30/17  Yes 45.9 36 - 46 %    MCV 92.0 80 - 100 fL    MCH 30.9 26 - 34 pg    MCHC 33.6 31 - 37 g/dL    RDW 12.7 12.5 - 15.4 %    Platelets 092 480 - 848 k/uL    MPV 6.6 6.0 - 12.0 fL    NRBC Automated NOT REPORTED per 100 WBC    Differential Type NOT REPORTED     Seg Neutrophils 92 (H) 36 - 66 %    Lymphocytes 4 (L) 24 - 44 %    Monocytes 4 2 - 11 %    Eosinophils % 0 (L) 1 - 4 %    Basophils 0 0 - 2 %    Immature Granulocytes NOT REPORTED 0 %    Segs Absolute 16.20 (H) 1.8 - 7.7 k/uL    Absolute Lymph # 0.60 (L) 1.0 - 4.8 k/uL    Absolute Mono # 0.60 0.1 - 1.2 k/uL    Absolute Eos # 0.00 0.0 - 0.4 k/uL    Basophils Absolute 0.00 0.0 - 0.2 k/uL    Absolute Immature Granulocyte NOT REPORTED 0.00 - 0.30 k/uL    WBC Morphology NOT REPORTED     RBC Morphology NOT REPORTED     Platelet Estimate NOT REPORTED    Comprehensive Metabolic Panel w/ Reflex to MG    Collection Time: 05/30/20  2:15 AM   Result Value Ref Range    Glucose 125 (H) 70 - 99 mg/dL    BUN 14 6 - 20 mg/dL    CREATININE 0.60 0.50 - 0.90 mg/dL    Bun/Cre Ratio NOT REPORTED 9 - 20    Calcium 9.8 8.6 - 10.4 mg/dL    Sodium 140 135 - 144 mmol/L    Potassium 4.5 3.7 - 5.3 mmol/L    Chloride 103 98 - 107 mmol/L    CO2 24 20 - 31 mmol/L    Anion Gap 13 9 - 17 mmol/L    Alkaline Phosphatase 44 35 - 104 U/L    ALT 14 5 - 33 U/L    AST 17 <32 U/L    Total Bilirubin 0.50 0.3 - 1.2 mg/dL    Total Protein 7.8 6.4 - 8.3 g/dL    Alb 4.5 3.5 - 5.2 g/dL    Albumin/Globulin Ratio 1.4 1.0 - 2.5    GFR Non-African American >60 >60 mL/min    GFR African American >60 >60 mL/min    GFR Comment          GFR Staging NOT REPORTED    Lipase    Collection Time: 05/30/20  2:15 AM   Result Value Ref Range    Lipase 28 13 - 60 U/L   Lactic Acid    Collection Time: 05/30/20  2:15 AM   Result Value Ref Range    Lactic Acid 2.2 0.5 - 2.2 mmol/L   HCG Qualitative, Serum    Collection Time: 05/30/20  2:15 AM   Result Value Ref Range    hCG Qual NEGATIVE NEGATIVE   Urinalysis Reflex to Culture Dose modulation, iterative reconstruction, and/or weight based adjustment of the mA/kV was utilized to reduce the radiation dose to as low as reasonably achievable. COMPARISON: None. HISTORY: ORDERING SYSTEM PROVIDED HISTORY: pain, nausea, vomiting TECHNOLOGIST PROVIDED HISTORY: pain, nausea, vomiting Reason for Exam: Pain, Nausea and Vomiting. Hx Crohn's Acuity: Acute Type of Exam: Initial FINDINGS: Lower Chest: The lung bases are well aerated. Pleural surfaces are unremarkable and no evidence of pleural effusion is identified. Organs: The liver, gallbladder, spleen, pancreas, adrenal glands, kidneys, are unremarkable in appearance. GI/Bowel: Distal jejunal and ileal small bowel marked multifocal mucosal thickening and edema is noted with narrowing of the lumen present. More proximal small bowel dilated fluid-filled bowel loops are noted which measure up to 53 mm in transluminal diameter. The stomach is unremarkable without wall thickening or distention. Bowel loops are otherwise unremarkable in appearance without evidence of obstruction, distension or mucosal thickening. The appendix is visualized and is unremarkable. Pelvis: The urinary bladder is well distended and unremarkable in appearance. No evidence of pelvic free fluid is seen. Uterus is anteverted in position and is unremarkable in appearance. Peritoneum/Retroperitoneum: No evidence of retroperitoneal or intraperitoneal lymphadenopathy is identified. Minimal scattered intraperitoneal free fluid is seen within the mesentery adjacent to the dilated small bowel loops. Bones/Soft Tissues: The bones, skeletal muscle bundles, fascial planes and subcutaneous soft tissues are unremarkable in appearance. 1. Distal small bowel marked mucosal thickening and edema, as discussed above, consistent with presentation of inflammatory bowel disease compatible with reported patient history of Crohn's disease.  2. Associated ileus and/or intermittent distal small

## 2020-05-30 NOTE — CONSULTS
30 tablet 3    azaTHIOprine (IMURAN) 50 MG tablet       levonorgestrel-ethinyl estradiol (INTROVALE) 0.15-0.03 MG per tablet Take 1 tablet by mouth daily 91 tablet 2    lamoTRIgine (LAMICTAL) 150 MG tablet 200 mg       Cholecalciferol (VITAMIN D) 2000 units CAPS capsule Take by mouth daily      Multiple Vitamins-Minerals (MULTIVITAMIN PO) Take by mouth      pimecrolimus (ELIDEL) 1 % cream Apply topically 2 times daily. 100 g 1    NONFORMULARY daily Indications: Flouride Toothpaste  - RX      hydrocortisone 2.5 % cream Apply topically 2 times daily. 20 Tube 3    dicyclomine (BENTYL) 10 MG capsule 1-2 capsules prn      budesonide (ENTOCORT EC) 3 MG extended release capsule       calcipotriene (DOVONEX) 0.005 % cream Apply topically 2 times daily. (Patient not taking: Reported on 11/13/2019) 1 Tube 4       Patient   Does Use ASA, NSAID No  Allergies  No Known Allergies     Social   Social History     Tobacco Use    Smoking status: Never Smoker    Smokeless tobacco: Never Used   Substance Use Topics    Alcohol use: No     Alcohol/week: 0.0 standard drinks        PSYCH HISTORY:  Depression No  Anxiety No  Suicide No       Family History   Problem Relation Age of Onset    Bipolar Disorder Mother     Other Mother     Alcohol Abuse Mother     Alcohol Abuse Maternal Aunt     Bipolar Disorder Maternal Grandfather     Diabetes Maternal Grandfather     Alcohol Abuse Maternal Grandfather     High Blood Pressure Maternal Grandfather     Stroke Maternal Grandfather     Miscarriages / Stillbirths Neg Hx     Coronary Art Dis Neg Hx     Colon Cancer Neg Hx     Uterine Cancer Neg Hx     Ovarian Cancer Neg Hx     Breast Cancer Neg Hx       No family history of colon cancer, Crohn's disease, or ulcerative colitis.      Review of Systems  Constitutional: negative  Eyes: negative  Ears, nose, mouth, throat, and face: negative  Respiratory: negative  Cardiovascular: negative  Gastrointestinal: negative  Genitourinary:negative  Integument/breast: negative  Hematologic/lymphatic: negative  Musculoskeletal:negative  Endocrine: negative           Physical Exam  Blood pressure 108/65, pulse 85, temperature 98.1 °F (36.7 °C), temperature source Oral, resp. rate 15, height 5' 2\" (1.575 m), weight 128 lb 15.5 oz (58.5 kg), SpO2 98 %, not currently breastfeeding. General Appearance: alert and oriented to person, place and time, well-developed and well-nourished, in no acute distress  Skin: warm and dry, no rash or erythema  Head: normocephalic and atraumatic  Eyes: pupils equal, round, and reactive to light, extraocular eye movements intact, conjunctivae normal  ENT: hearing grossly normal bilaterally  Neck: neck supple and non tender without mass, no thyromegaly or thyroid nodules, no cervical lymphadenopathy   Pulmonary/Chest: clear to auscultation bilaterally- no wheezes, rales or rhonchi, normal air movement, no respiratory distress  Cardiovascular: normal rate, regular rhythm, normal S1 and S2, no murmurs, rubs, clicks or gallops, distal pulses intact, no carotid bruits  Abdomen: soft, non-tender, non-distended, normal bowel sounds, no masses or organomegaly  Extremities: no cyanosis, clubbing or edema  Musculoskeletal: normal range of motion, no joint swelling, deformity or tenderness  Neurologic: no cranial nerve deficit and muscle strength normal    Data Review:    Recent Labs     05/30/20 0215   WBC 17.5*   HGB 15.4   HCT 45.9   MCV 92.0        Recent Labs     05/30/20 0215      K 4.5      CO2 24   BUN 14   CREATININE 0.60     Recent Labs     05/30/20 0215   AST 17   ALT 14   BILITOT 0.50   ALKPHOS 44     Recent Labs     05/30/20 0215   LIPASE 28     No results for input(s): PROTIME, INR in the last 72 hours. No results for input(s): PTT in the last 72 hours. No results for input(s): OCCULTBLD in the last 72 hours.   CEA:  No results found for: CEA  Ca 125:  No results

## 2020-05-30 NOTE — FLOWSHEET NOTE
Patient arrived on the unit with all of her belongings via stretcher. Patient's vitals were taken and she was oriented to the room. Patient placed on monitor. RN notified Dora PONCE that patient had arrived.      05/30/20 5843   Provider Notification   Reason for Communication New orders   Provider Name Ras Johnson   Provider Notification Physician Assistant   Method of Communication Secure Message   Response Waiting for response   Notification Time 115

## 2020-05-31 LAB
ABSOLUTE EOS #: 0 K/UL (ref 0–0.4)
ABSOLUTE IMMATURE GRANULOCYTE: ABNORMAL K/UL (ref 0–0.3)
ABSOLUTE LYMPH #: 0.5 K/UL (ref 1–4.8)
ABSOLUTE MONO #: 0.2 K/UL (ref 0.1–1.3)
BASOPHILS # BLD: 0 % (ref 0–2)
BASOPHILS ABSOLUTE: 0 K/UL (ref 0–0.2)
DIFFERENTIAL TYPE: ABNORMAL
EOSINOPHILS RELATIVE PERCENT: 0 % (ref 0–4)
HCT VFR BLD CALC: 34.7 % (ref 36–46)
HEMOGLOBIN: 11.7 G/DL (ref 12–16)
IMMATURE GRANULOCYTES: ABNORMAL %
LYMPHOCYTES # BLD: 7 % (ref 24–44)
MCH RBC QN AUTO: 30.9 PG (ref 26–34)
MCHC RBC AUTO-ENTMCNC: 33.7 G/DL (ref 31–37)
MCV RBC AUTO: 91.8 FL (ref 80–100)
MONOCYTES # BLD: 2 % (ref 1–7)
NRBC AUTOMATED: ABNORMAL PER 100 WBC
PDW BLD-RTO: 12.9 % (ref 11.5–14.9)
PLATELET # BLD: 349 K/UL (ref 150–450)
PLATELET ESTIMATE: ABNORMAL
PMV BLD AUTO: 6.5 FL (ref 6–12)
RBC # BLD: 3.78 M/UL (ref 4–5.2)
RBC # BLD: ABNORMAL 10*6/UL
SEG NEUTROPHILS: 91 % (ref 36–66)
SEGMENTED NEUTROPHILS ABSOLUTE COUNT: 6.2 K/UL (ref 1.3–9.1)
WBC # BLD: 6.9 K/UL (ref 3.5–11)
WBC # BLD: ABNORMAL 10*3/UL

## 2020-05-31 PROCEDURE — 96361 HYDRATE IV INFUSION ADD-ON: CPT

## 2020-05-31 PROCEDURE — 96372 THER/PROPH/DIAG INJ SC/IM: CPT

## 2020-05-31 PROCEDURE — 6370000000 HC RX 637 (ALT 250 FOR IP): Performed by: PHYSICIAN ASSISTANT

## 2020-05-31 PROCEDURE — 85025 COMPLETE CBC W/AUTO DIFF WBC: CPT

## 2020-05-31 PROCEDURE — 36415 COLL VENOUS BLD VENIPUNCTURE: CPT

## 2020-05-31 PROCEDURE — 96376 TX/PRO/DX INJ SAME DRUG ADON: CPT

## 2020-05-31 PROCEDURE — G0378 HOSPITAL OBSERVATION PER HR: HCPCS

## 2020-05-31 PROCEDURE — 6360000002 HC RX W HCPCS: Performed by: INTERNAL MEDICINE

## 2020-05-31 PROCEDURE — 99232 SBSQ HOSP IP/OBS MODERATE 35: CPT | Performed by: INTERNAL MEDICINE

## 2020-05-31 PROCEDURE — 6370000000 HC RX 637 (ALT 250 FOR IP): Performed by: INTERNAL MEDICINE

## 2020-05-31 PROCEDURE — APPNB30 APP NON BILLABLE TIME 0-30 MINS: Performed by: NURSE PRACTITIONER

## 2020-05-31 PROCEDURE — 6360000002 HC RX W HCPCS: Performed by: PHYSICIAN ASSISTANT

## 2020-05-31 PROCEDURE — 6360000002 HC RX W HCPCS: Performed by: NURSE PRACTITIONER

## 2020-05-31 PROCEDURE — 2580000003 HC RX 258: Performed by: PHYSICIAN ASSISTANT

## 2020-05-31 PROCEDURE — 1200000000 HC SEMI PRIVATE

## 2020-05-31 PROCEDURE — 6370000000 HC RX 637 (ALT 250 FOR IP): Performed by: NURSE PRACTITIONER

## 2020-05-31 RX ORDER — LOPERAMIDE HYDROCHLORIDE 2 MG/1
4 CAPSULE ORAL 4 TIMES DAILY PRN
Status: DISCONTINUED | OUTPATIENT
Start: 2020-05-31 | End: 2020-06-01 | Stop reason: HOSPADM

## 2020-05-31 RX ADMIN — SODIUM CHLORIDE: 9 INJECTION, SOLUTION INTRAVENOUS at 09:27

## 2020-05-31 RX ADMIN — METHYLPREDNISOLONE SODIUM SUCCINATE 40 MG: 40 INJECTION, POWDER, FOR SOLUTION INTRAMUSCULAR; INTRAVENOUS at 02:01

## 2020-05-31 RX ADMIN — DICYCLOMINE HYDROCHLORIDE 10 MG: 10 CAPSULE ORAL at 12:32

## 2020-05-31 RX ADMIN — LOPERAMIDE HYDROCHLORIDE 4 MG: 2 CAPSULE ORAL at 19:51

## 2020-05-31 RX ADMIN — SODIUM CHLORIDE: 9 INJECTION, SOLUTION INTRAVENOUS at 19:57

## 2020-05-31 RX ADMIN — PROMETHAZINE HYDROCHLORIDE 12.5 MG: 25 TABLET ORAL at 04:51

## 2020-05-31 RX ADMIN — PREDNISONE 50 MG: 20 TABLET ORAL at 15:31

## 2020-05-31 RX ADMIN — DICYCLOMINE HYDROCHLORIDE 10 MG: 10 CAPSULE ORAL at 15:31

## 2020-05-31 RX ADMIN — PROMETHAZINE HYDROCHLORIDE 12.5 MG: 25 TABLET ORAL at 17:02

## 2020-05-31 RX ADMIN — ENOXAPARIN SODIUM 40 MG: 40 INJECTION SUBCUTANEOUS at 07:31

## 2020-05-31 RX ADMIN — LAMOTRIGINE 200 MG: 100 TABLET ORAL at 07:31

## 2020-05-31 RX ADMIN — PROMETHAZINE HYDROCHLORIDE 12.5 MG: 25 TABLET ORAL at 12:33

## 2020-05-31 RX ADMIN — DICYCLOMINE HYDROCHLORIDE 10 MG: 10 CAPSULE ORAL at 06:20

## 2020-05-31 RX ADMIN — AZATHIOPRINE 50 MG: 50 TABLET ORAL at 07:31

## 2020-05-31 NOTE — PROGRESS NOTES
for swelling/edema   ALLERGIC/IMMUNOLOGIC:  negative for urticaria , itching  ENDOCRINE:  negative increase in drinking, increase in urination, hot or cold intolerance  MUSCULOSKELETAL:  negative joint pains, muscle aches, swelling of joints  NEUROLOGICAL:  negative for headaches, dizziness, lightheadedness, numbness, pain, tingling extremities  BEHAVIOR/PSYCH:  negative for depression, anxiety    Physical Exam:   /64   Pulse 64   Temp 98.2 °F (36.8 °C) (Oral)   Resp 15   Ht 5' 2\" (1.575 m)   Wt 128 lb 15.5 oz (58.5 kg)   SpO2 98%   BMI 23.59 kg/m²   No results for input(s): POCGLU in the last 72 hours. General Appearance:  alert, well appearing, and in no acute distress  Mental status: oriented to person, place, and time with normal affect  Head:  normocephalic, atraumatic. Eye: no icterus, redness, pupils equal and reactive, extraocular eye movements intact, conjunctiva clear  Ear: normal external ear, no discharge, hearing intact  Nose:  no drainage noted  Mouth: mucous membranes moist  Neck: supple, no carotid bruits, thyroid not palpable  Lungs: Bilateral equal air entry, clear to ausculation, no wheezing, rales or rhonchi, normal effort  Cardiovascular: normal rate, regular rhythm, no murmur, gallop, rub.   Abdomen: Soft mildly distended tender in the central area neurologic: There are no new focal motor or sensory deficits, normal muscle tone and bulk, no abnormal sensation, normal speech, cranial nerves II through XII grossly intact  Skin: No gross lesions, rashes, bruising or bleeding on exposed skin area  Extremities:  peripheral pulses palpable, no pedal edema or calf pain with palpation  Psych: normal affect     Investigations:      Laboratory Testing:  Recent Results (from the past 24 hour(s))   Sedimentation Rate    Collection Time: 05/30/20  4:59 PM   Result Value Ref Range    Sed Rate 19 0 - 20 mm   CBC Auto Differential    Collection Time: 05/31/20  9:54 AM   Result Value Ref Range [facility]) injection 4 mg  4 mg Intravenous Q6H PRN KRIS Lombardo        enoxaparin (LOVENOX) injection 40 mg  40 mg Subcutaneous Daily KRIS Lombardo   40 mg at 05/31/20 0731    0.9 % sodium chloride infusion   Intravenous Continuous KRIS Lombardo 100 mL/hr at 05/31/20 6082      HYDROmorphone (DILAUDID) injection 1 mg  1 mg Intravenous Q4H PRN Brett Hernandez MD   1 mg at 05/30/20 1143    calcipotriene (DOVONEX) 0.005 % cream   Topical BID Brett Hernandez MD        azaTHIOprine (IMURAN) tablet 50 mg  50 mg Oral Daily Brett Hernandez MD   50 mg at 05/31/20 0731    dicyclomine (BENTYL) capsule 10 mg  10 mg Oral TID AC Brett Hernandez MD   10 mg at 05/31/20 7920    lamoTRIgine (LAMICTAL) tablet 200 mg  200 mg Oral Daily Brett Hernandez MD   200 mg at 05/31/20 0731    pimecrolimus (ELIDEL) 1 % cream   Topical BID Brett Hernandez MD        methylPREDNISolone sodium (SOLU-MEDROL) injection 40 mg  40 mg Intravenous Q12H SUDHIR Gallegos - CNP   40 mg at 05/31/20 0201          Brett Hernandez MD  5/31/2020  10:51 AM

## 2020-05-31 NOTE — PROGRESS NOTES
mesentery adjacent to the dilated small bowel loops.       Bones/Soft Tissues: The bones, skeletal muscle bundles, fascial planes and   subcutaneous soft tissues are unremarkable in appearance.           Impression   1. Distal small bowel marked mucosal thickening and edema, as discussed   above, consistent with presentation of inflammatory bowel disease compatible   with reported patient history of Crohn's disease. 2. Associated ileus and/or intermittent distal small bowel obstruction   associated with the inflammatory bowel disease with more proximal small bowel   dilated fluid-filled bowel loops, as discussed above. 3. Associated minimal scattered intraperitoneal free fluid.             ASSESSMENT/PLAN:  1. Exacerbation of crohns disease with ileus; improved  -d/w dr Kirsty Thompson will stop the iv steroids and start oral steroid taper today, she is to have 50mg daily for 7 days then 40mg daily for 7 days then 30mg daily for 7 days then 20mg daily for 7 days then 10mg daily for 7 days  -IBD panel pending  Full liquid diet  -will need MRA/capsule endoscopy as outpatient with her normal GI MD once discharged    Case d/w dr Bradford Szymanski signing off    This plan was formulated in collaboration with Dr. Ana Sanders . Electronically signed by: SUDHIR Healy - CNP on 5/31/2020 at 7:28 AM     Attending Physician Statement  I have discussed the care of [facility] and   I have examined the patient myselft independently, and taken ros and hpi , including pertinent history and exam findings,  with the author of this note . I have reviewed the key elements of all parts of the encounter with the nurse practitioner/resident.     I agree with the assessment, plan and orders as documented by the above health care provider         Electronically signed by Sherie Floyd MD

## 2020-06-01 ENCOUNTER — TELEPHONE (OUTPATIENT)
Dept: GASTROENTEROLOGY | Age: 25
End: 2020-06-01

## 2020-06-01 VITALS
TEMPERATURE: 98.2 F | HEART RATE: 71 BPM | DIASTOLIC BLOOD PRESSURE: 64 MMHG | WEIGHT: 134.3 LBS | SYSTOLIC BLOOD PRESSURE: 111 MMHG | OXYGEN SATURATION: 98 % | RESPIRATION RATE: 16 BRPM | BODY MASS INDEX: 24.71 KG/M2 | HEIGHT: 62 IN

## 2020-06-01 PROCEDURE — 6370000000 HC RX 637 (ALT 250 FOR IP): Performed by: NURSE PRACTITIONER

## 2020-06-01 PROCEDURE — 6360000002 HC RX W HCPCS: Performed by: INTERNAL MEDICINE

## 2020-06-01 PROCEDURE — 96372 THER/PROPH/DIAG INJ SC/IM: CPT

## 2020-06-01 PROCEDURE — G0378 HOSPITAL OBSERVATION PER HR: HCPCS

## 2020-06-01 PROCEDURE — 6370000000 HC RX 637 (ALT 250 FOR IP): Performed by: INTERNAL MEDICINE

## 2020-06-01 PROCEDURE — 99239 HOSP IP/OBS DSCHRG MGMT >30: CPT | Performed by: INTERNAL MEDICINE

## 2020-06-01 PROCEDURE — 6360000002 HC RX W HCPCS: Performed by: PHYSICIAN ASSISTANT

## 2020-06-01 PROCEDURE — 2580000003 HC RX 258: Performed by: PHYSICIAN ASSISTANT

## 2020-06-01 PROCEDURE — 96361 HYDRATE IV INFUSION ADD-ON: CPT

## 2020-06-01 RX ORDER — PREDNISONE 10 MG/1
TABLET ORAL
Qty: 105 TABLET | Refills: 0 | Status: SHIPPED | OUTPATIENT
Start: 2020-06-01 | End: 2020-07-06

## 2020-06-01 RX ORDER — PREDNISONE 20 MG/1
30 TABLET ORAL DAILY
Qty: 11 TABLET | Refills: 0 | Status: SHIPPED | OUTPATIENT
Start: 2020-06-01 | End: 2020-06-01 | Stop reason: SDUPTHER

## 2020-06-01 RX ORDER — PREDNISONE 10 MG/1
10 TABLET ORAL DAILY
Qty: 7 TABLET | Refills: 0 | Status: SHIPPED | OUTPATIENT
Start: 2020-06-01 | End: 2020-06-01 | Stop reason: SDUPTHER

## 2020-06-01 RX ORDER — PREDNISONE 20 MG/1
20 TABLET ORAL DAILY
Qty: 7 TABLET | Refills: 0 | Status: SHIPPED | OUTPATIENT
Start: 2020-06-01 | End: 2020-06-01 | Stop reason: SDUPTHER

## 2020-06-01 RX ORDER — PREDNISONE 20 MG/1
40 TABLET ORAL DAILY
Qty: 14 TABLET | Refills: 0 | Status: SHIPPED | OUTPATIENT
Start: 2020-06-01 | End: 2020-06-01 | Stop reason: SDUPTHER

## 2020-06-01 RX ORDER — PREDNISONE 50 MG/1
50 TABLET ORAL DAILY
Qty: 5 TABLET | Refills: 0 | Status: SHIPPED | OUTPATIENT
Start: 2020-06-01 | End: 2020-06-01 | Stop reason: SDUPTHER

## 2020-06-01 RX ADMIN — PREDNISONE 50 MG: 20 TABLET ORAL at 08:30

## 2020-06-01 RX ADMIN — AZATHIOPRINE 50 MG: 50 TABLET ORAL at 08:29

## 2020-06-01 RX ADMIN — ENOXAPARIN SODIUM 40 MG: 40 INJECTION SUBCUTANEOUS at 08:31

## 2020-06-01 RX ADMIN — LAMOTRIGINE 200 MG: 100 TABLET ORAL at 08:30

## 2020-06-01 RX ADMIN — SODIUM CHLORIDE: 9 INJECTION, SOLUTION INTRAVENOUS at 05:35

## 2020-06-01 RX ADMIN — DICYCLOMINE HYDROCHLORIDE 10 MG: 10 CAPSULE ORAL at 05:30

## 2020-06-01 RX ADMIN — DICYCLOMINE HYDROCHLORIDE 10 MG: 10 CAPSULE ORAL at 11:50

## 2020-06-01 NOTE — TELEPHONE ENCOUNTER
Pharmacy request clarification on multiple prednisone orders that were sent today. Writer prepared and signed script per Dr Mare Flores. Pharmacy is notified of change and to discard other prednisone scripts.

## 2020-06-01 NOTE — CARE COORDINATION
ONGOING DISCHARGE PLAN:    Spoke with patient regarding discharge plan and patient confirms that plan is still to go home with no needs. Probable discharge today    Low fiber diet    Remains on IV fluids    Will continue to follow for additional discharge needs.     Electronically signed by Ash Ford RN on 6/1/2020 at 11:34 AM

## 2020-06-03 NOTE — DISCHARGE SUMMARY
[place] [address] Internal Medicine    Discharge Summary     Patient ID: Anna Mraie Mcclellan  :  1995   MRN: [id number]     ACCOUNT:  [de-identified]   Patient's PCP: Nitish Nazario MD  Admit Date: 2020   Discharge Date: 6/3/2020    Length of Stay: 2  Code Status:  Prior  Admitting Physician: Olman Hsu MD  Discharge Physician: Olman Hsu MD     Active Discharge Diagnoses:     Primary Problem  <principal problem not specified>      [place] Problems    Diagnosis Date Noted    Exacerbation of Crohn's disease, unspecified complication ([facility] [address] [X94.487] 2020    [facility] [P32.029] 2020       Admission Condition:  fair     Discharged Condition: fair    Hospital Stay:     Hospital Course:  Anna Marie Mcclellan is a 23 y.o. female who was admitted for the management of <principal problem not specified> , presented to ER with No chief complaint on file. Patient with a history of Crohn's disease admitted with nausea vomiting abdominal pain found to be in small bowel obstruction conservative treatment symptoms resolved patient was treated with IV steroids discharged on below medication with outpatient GI evjasmyn Moses gi  todd with dr shelton Bennett          Significant therapeutic interventions:     Significant Diagnostic Studies:   Labs / Micro:        ,     Radiology:    Ct Abdomen Pelvis W Iv Contrast Additional Contrast? None    Result Date: 2020  EXAMINATION: CT OF THE ABDOMEN AND PELVIS WITH CONTRAST 2020 2:27 am TECHNIQUE: CT of the abdomen and pelvis was performed with the administration of intravenous contrast. Multiplanar reformatted images are provided for review. Dose modulation, iterative reconstruction, and/or weight based adjustment of the mA/kV was utilized to reduce the radiation dose to as low as reasonably achievable. COMPARISON: None.  HISTORY: ORDERING SYSTEM PROVIDED HISTORY: for 7 days, THEN 3 tablets daily for 7 days, THEN 2 tablets daily for 7 days, THEN 1 tablet daily for 7 days. Start taking on:  June 1, 2020           Where to Get Your Medications      You can get these medications from any pharmacy    Bring a paper prescription for each of these medications  · predniSONE 10 MG tablet         Time Spent on discharge is  35 mins in patient examination, evaluation, counseling as well as medication reconciliation, prescriptions for required medications, discharge plan and follow up. Electronically signed by   Brett Hernandez MD  6/3/2020  3:14 PM      Thank you Dr. Debora Cortez MD for the opportunity to be involved in this patient's care.

## 2020-06-08 LAB
SEND OUT REPORT: NORMAL
TEST NAME: NORMAL

## 2020-06-08 RX ORDER — LEVONORGESTREL AND ETHINYL ESTRADIOL 0.15-0.03
KIT ORAL
Qty: 91 TABLET | Refills: 1 | Status: ON HOLD | OUTPATIENT
Start: 2020-06-08 | End: 2020-08-14

## 2020-06-17 ENCOUNTER — TELEMEDICINE (OUTPATIENT)
Dept: FAMILY MEDICINE CLINIC | Age: 25
End: 2020-06-17
Payer: COMMERCIAL

## 2020-06-17 PROCEDURE — 99213 OFFICE O/P EST LOW 20 MIN: CPT | Performed by: FAMILY MEDICINE

## 2020-06-17 RX ORDER — PREDNISONE 1 MG/1
5 TABLET ORAL DAILY
Qty: 12 TABLET | Refills: 0 | Status: SHIPPED | OUTPATIENT
Start: 2020-06-17 | End: 2020-06-29

## 2020-06-17 NOTE — PROGRESS NOTES
capsule Take by mouth daily      Multiple Vitamins-Minerals (MULTIVITAMIN PO) Take by mouth      pimecrolimus (ELIDEL) 1 % cream Apply topically 2 times daily. 100 g 1    NONFORMULARY daily Indications: Flouride Toothpaste  - RX      calcipotriene (DOVONEX) 0.005 % cream Apply topically 2 times daily. (Patient not taking: Reported on 11/13/2019) 1 Tube 4    hydrocortisone 2.5 % cream Apply topically 2 times daily. 20 Tube 3     No current facility-administered medications for this visit. ALLERGIES:  No Known Allergies    Social History     Tobacco Use    Smoking status: Never Smoker    Smokeless tobacco: Never Used   Substance Use Topics    Alcohol use: No     Alcohol/week: 0.0 standard drinks      There is no height or weight on file to calculate BMI. There were no vitals taken for this visit. Subjective:      HPI    22 yo female reaching out per tele med visit today. Was admitted to the hospital bc of acute exacerbation of the crohn's. 05.30.2020. Has been doing well since then. FU with Gi already done. meds were changed. Pt does not have any concerns. changed her job [employer] service. Review of Systems   Constitutional: Negative for fever and unexpected weight change. Respiratory: Negative for cough and shortness of breath. Cardiovascular: Negative for chest pain and leg swelling. Gastrointestinal: Negative for diarrhea, constipation and blood in stool. Musculoskeletal: Negative for back pain and gait problem. Skin: Negative for color change and rash. Neurological: Negative for dizziness and headaches. Psychiatric/Behavioral: Negative for confusion and agitation. Objective:   Physical Exam  General appearance: normal development, habitus and attention, no deformities. No distress. Pulmo: normal breathing pattern. Psychiatric: alert and oriented to place, time and person. Normal mood and affect. Assessment:       Diagnosis Orders   1.  Exacerbation of Crohn's

## 2020-07-20 ENCOUNTER — PATIENT MESSAGE (OUTPATIENT)
Dept: FAMILY MEDICINE CLINIC | Age: 25
End: 2020-07-20

## 2020-07-21 NOTE — TELEPHONE ENCOUNTER
From: Zuly Lopez  To: Wendy Juan MD  Sent: 7/20/2020 11:27 PM EDT  Subject: Non-Urgent Medical Question    Hi Dr. Alise Iverson been unhappy with the care my gastroenterologist at [facility] clinic has been providing. I was wondering if you could refer me to a new gastroenterologist. I saw Dr Milo Garcia when I was in the hospital, if you'd recommend him. If there's a different gastroenterologist you think would better fit me, I'm happy to see whoever you think is best. If you need to have personal contact to make a referral let me know.  Thanks!  -Chivo Pascual

## 2020-08-04 ENCOUNTER — TELEPHONE (OUTPATIENT)
Dept: GASTROENTEROLOGY | Age: 25
End: 2020-08-04

## 2020-08-04 NOTE — TELEPHONE ENCOUNTER
LVM for the patient that due to provider conflict 4/23/78 has been cancelled and moved to 8/6/20 3:15 pm at the [facility]. If this does not work to call.

## 2020-08-05 ENCOUNTER — OFFICE VISIT (OUTPATIENT)
Dept: DERMATOLOGY | Age: 25
End: 2020-08-05
Payer: COMMERCIAL

## 2020-08-05 VITALS
TEMPERATURE: 99.1 F | HEIGHT: 62 IN | BODY MASS INDEX: 24.66 KG/M2 | SYSTOLIC BLOOD PRESSURE: 123 MMHG | WEIGHT: 134 LBS | DIASTOLIC BLOOD PRESSURE: 83 MMHG | OXYGEN SATURATION: 99 % | HEART RATE: 109 BPM

## 2020-08-05 PROCEDURE — 99202 OFFICE O/P NEW SF 15 MIN: CPT | Performed by: DERMATOLOGY

## 2020-08-05 RX ORDER — DOXYCYCLINE HYCLATE 50 MG/1
TABLET, FILM COATED ORAL
Qty: 90 TABLET | Refills: 0 | Status: ON HOLD | OUTPATIENT
Start: 2020-08-05 | End: 2020-11-26

## 2020-08-05 RX ORDER — SODIUM SULFACETAMIDE, SULFUR 9 %-4.5 %
KIT TOPICAL
Qty: 1 KIT | Refills: 3 | Status: SHIPPED | OUTPATIENT
Start: 2020-08-05

## 2020-08-05 NOTE — PROGRESS NOTES
Dermatology Patient Note  [address]  [place] [address]  Dept: [phone]  Dept Fax: [phone]      VISIT DATE: 8/5/2020   REFERRING PROVIDER: Lew Sutton MD      Victorino Goldberg is a 23 y.o. female  who presents today in the office for:    New Patient (butterfly rash on face since early spring; has pictures on phone to show you; tried OTC face wash and moisturizer; hydrocortisone helped; stopped elidel (which helped) due to being imunosuppressed--off Humira and on Imuron; has been off Humira x3 weeks--noticed it started going away once she stopped)      HISTORY OF PRESENT ILLNESS:  HPI Rash:    Newton Ang was seen today for initial evaluation of Rash    Duration of Rash: March    Course: Has Crohns was started of Humira in January and in March developed red rash and scaling on face seems to have improved since d/c Humira    Areas of Involvement: Face    Associated Symptoms: Irritation    Exacerbating Factors: Humira? Current Medications for this Rash:  None     Previously Tried Medications: hydrocortisone    Problem Specific Family Hx: rosacea - mom      CURRENT MEDICATIONS:   Current Outpatient Medications   Medication Sig Dispense Refill    Doxycycline Hyclate 50 MG TABS Take 1 tablet daily 90 tablet 0    metroNIDAZOLE (METROCREAM) 0.75 % cream Apply topically 2 times daily.  45 g 2    Sulfacetamide-Sulfur-Cleanser (SULFACETAMIDE SOD-SULFUR [address]) 9-4.5 % KIT Wash face twice daily - can dispense any covered sulfacetamide wash 1 kit 3    INTROVALE 0.15-0.03 MG per tablet TAKE ONE TABLET BY MOUTH DAILY 91 tablet 1    dicyclomine (BENTYL) 10 MG capsule 1-2 capsules prn      Probiotic Product (PROBIOTIC-10 PO)       cetirizine (ZYRTEC) 10 MG tablet Take 1 tablet by mouth daily 30 tablet 3    azaTHIOprine (IMURAN) 50 MG tablet       lamoTRIgine (LAMICTAL) 150 MG tablet 200 mg       Cholecalciferol (VITAMIN D) 2000 units CAPS capsule Take by mouth daily      Multiple Vitamins-Minerals (MULTIVITAMIN PO) Take by mouth      pimecrolimus (ELIDEL) 1 % cream Apply topically 2 times daily. 100 g 1    NONFORMULARY daily Indications: Flouride Toothpaste  - RX      hydrocortisone 2.5 % cream Apply topically 2 times daily. 20 Tube 3     No current facility-administered medications for this visit. ALLERGIES:   No Known Allergies    SOCIAL HISTORY:  Social History     Tobacco Use    Smoking status: Never Smoker    Smokeless tobacco: Never Used   Substance Use Topics    Alcohol use: No     Alcohol/week: 0.0 standard drinks       REVIEW OF SYSTEMS:  Review of Systems   Constitutional: Negative. Skin:Denies any new changing, growing or bleeding lesions or rashes except as described in the HPI     PHYSICAL EXAM:   /83   Pulse 109   Temp 99.1 °F (37.3 °C)   Ht 5' 2\" (1.575 m)   Wt 134 lb (60.8 kg)   LMP 06/24/2020 (Approximate)   SpO2 99%   BMI 24.51 kg/m²     General Exam:  General Appearance: No acute distress, Well nourished     Neuro: Alert and oriented to person, place and time  Psych: Normal affect   Lymph Node: Not performed    Cutaneous Exam: Performed as documented in clinic note below. Sun-exposed skin,which includes the head/face, neck, both arms, digits and/or nails was examined. Pertinent Physical Exam Findings:  Physical Exam  Skin:     Comments: Erythema, slight scaling and some acneiform papules on the cheeks and glabella, nasolabial fold and nose appear spared         Medical Necessity of Exam Performed:   Distribution of patient concerns    Additional Diagnostic Testing performed during exam: Not performed ,  Not performed    ASSESSMENT:   Diagnosis Orders   1. Rash  ARDEN Screen with Reflex       Plan of Action is as Follows:  Assessment 1. Rash  - Overall favor ET rosacea, given timing with Humira will check an ARDNE to r/o drug induced LE though without other symptoms this is much less likely  1.  Start applying metrocream to the nose, cheeks and forehead  2. Start taking doxycycline-50mg once daily with food (be sure to use sunscreen when outdoors  3. Start using sulfacetamide wash daily    - ARDEN Screen with Reflex; Future          Patient Instructions   1. Labs ordered  2. Start applying metrocream to the nose, cheeks and forehead  3. Start taking doxycycline-50mg once daily with food (be sure to use sunscreen when outdoors  4. Start using sulfacetamide wash daily  5. Follow up in the office in 3 months      Photo surveillance performed: No    Follow-up: 3 months    This note was created with the assistance of aspeech-recognition program.  Although the intention is to generate a document that actually reflects thecontent of the visit, no guarantees can be provided that every mistake has been identified and corrected by editing.     Electronically signed by Chito Cabral MD on 8/5/20 at 1:58 PM EDT

## 2020-08-06 ENCOUNTER — OFFICE VISIT (OUTPATIENT)
Dept: GASTROENTEROLOGY | Age: 25
End: 2020-08-06
Payer: COMMERCIAL

## 2020-08-06 ENCOUNTER — TELEPHONE (OUTPATIENT)
Dept: GASTROENTEROLOGY | Age: 25
End: 2020-08-06

## 2020-08-06 VITALS — HEIGHT: 62 IN | BODY MASS INDEX: 24.29 KG/M2 | WEIGHT: 132 LBS | TEMPERATURE: 97.9 F | RESPIRATION RATE: 12 BRPM

## 2020-08-06 PROCEDURE — 99214 OFFICE O/P EST MOD 30 MIN: CPT | Performed by: INTERNAL MEDICINE

## 2020-08-06 RX ORDER — BUPROPION HYDROCHLORIDE 150 MG/1
150 TABLET ORAL EVERY MORNING
Status: ON HOLD | COMMUNITY
End: 2020-11-26

## 2020-08-06 ASSESSMENT — ENCOUNTER SYMPTOMS
WHEEZING: 0
COUGH: 0
DIARRHEA: 1
ABDOMINAL PAIN: 1
VOMITING: 0
RECTAL PAIN: 0
CONSTIPATION: 0
TROUBLE SWALLOWING: 0
ABDOMINAL DISTENTION: 0
BLOOD IN STOOL: 0
NAUSEA: 1
CHOKING: 0
ANAL BLEEDING: 0

## 2020-08-06 NOTE — PROGRESS NOTES
GI CLINIC FOLLOW UP    INTERVAL HISTORY:   Amber Hidalgo MD  [address] [place] [address], [address] [facility]    Chief Complaint   Patient presents with    Crohn's Disease     Patient is f/u on hospital visit where she has exacerbation of Crohn's. She states she seen Dr peoples but felt she was not getting proper care. She is switching to Dr Mekhi Patel. Patient also states she starts Entyvio infusions tomorrow prescribed by Dr Braden Knapp. HISTORY OF PRESENT ILLNESS: Cierra Aviles is a 23 y.o. female , referred for evaluation of**CD, SBO    was seen in the hospital recently with SBO which has resolved with medical treatment using steroid. This is the second time she has small bowel obstruction she said since she was started on Humira and Imuran in January   history of Crohn's disease, following with a different gastroenterology practice since September when she was diagnosed with colonoscopy. Now she wants to switch and start following with us  She was told that she does have disease in the terminal ileum, she was tried on Imuran alone and she had low metabolite she said for which Humira was added but she is still on the Imuran, she did not require any surgery or any hospitalization until now. On Humira since Jan   CAT scan showed multifocal mucosal edema and thickening to the distal jejunum and ileal small bowel. On Humira/Imuran    Denied any skin/vision/joint issues  Denied any upper GI symptoms on review of system with her otherwise on remarkable    Past Medical,Family, and Social History reviewed and does contribute to the patient presentingcondition. Patient's PMH/PSH,SH,PSYCH Hx, MEDs, ALLERGIES, and ROS were all reviewed and updated in the appropriate sections.     PAST MEDICAL HISTORY:  Past Medical History:   Diagnosis Date    Anxiety     Crohn's disease of ileum ([facility] [address]     Depression     Seasonal allergies        Past Surgical History:   Procedure Laterality Date  Colon Cancer Neg Hx     Uterine Cancer Neg Hx     Ovarian Cancer Neg Hx     Breast Cancer Neg Hx          SOCIAL HISTORY:   Social History     Socioeconomic History    Marital status:      Spouse name: Not on file    Number of children: Not on file    Years of education: Not on file    Highest education level: Not on file   Occupational History    Not on file   Social Needs    Financial resource strain: Not on file    Food insecurity     Worry: Not on file     Inability: Not on file    Transportation needs     Medical: Not on file     Non-medical: Not on file   Tobacco Use    Smoking status: Never Smoker    Smokeless tobacco: Never Used   Substance and Sexual Activity    Alcohol use: No     Alcohol/week: 0.0 standard drinks    Drug use: No    Sexual activity: Not Currently     Partners: Male     Birth control/protection: Condom   Lifestyle    Physical activity     Days per week: Not on file     Minutes per session: Not on file    Stress: Not on file   Relationships    Social connections     Talks on phone: Not on file     Gets together: Not on file     Attends [employer] service: Not on file     Active member of club or organization: Not on file     Attends meetings of clubs or organizations: Not on file     Relationship status: Not on file    Intimate partner violence     Fear of current or ex partner: Not on file     Emotionally abused: Not on file     Physically abused: Not on file     Forced sexual activity: Not on file   Other Topics Concern    Not on file   Social History Narrative    Not on file       REVIEW OF SYSTEMS: A 12-point review of systemswas obtained and pertinent positives and negatives were enumerated above in the history of present illness. All other reviewed systems / symptoms were negative. Review of Systems   Constitutional: Negative for appetite change, fatigue and unexpected weight change. HENT: Negative for trouble swallowing.     Respiratory: Negative distress. Appearance: She is well-developed. She is not diaphoretic. HENT:      Head: Normocephalic. Mouth/Throat:      Pharynx: No oropharyngeal exudate. Eyes:      General: No scleral icterus. Pupils: Pupils are equal, round, and reactive to light. Neck:      Musculoskeletal: Normal range of motion and neck supple. Thyroid: No thyromegaly. Vascular: No JVD. Trachea: No tracheal deviation. Cardiovascular:      Rate and Rhythm: Normal rate and regular rhythm. Heart sounds: Normal heart sounds. No murmur. Pulmonary:      Effort: Pulmonary effort is normal. No respiratory distress. Breath sounds: Normal breath sounds. No wheezing. Abdominal:      General: Bowel sounds are normal. There is no distension. Palpations: Abdomen is soft. Tenderness: There is no abdominal tenderness. There is no guarding or rebound. Comments: No ascites   Musculoskeletal: Normal range of motion. Skin:     General: Skin is warm. Coloration: Skin is not pale. Findings: No erythema or rash. Comments: She is not diaphoretic   Neurological:      Mental Status: She is alert and oriented to person, place, and time. Deep Tendon Reflexes: Reflexes are normal and symmetric. Psychiatric:         Behavior: Behavior normal.         Thought Content: Thought content normal.         Judgment: Judgment normal.           IMPRESSION: Ms. Nitin Nino is a 23 y.o. female with      Diagnosis Orders   1.  Crohn's disease of small intestine with intestinal obstruction ([facility] [address] [address]  MRI ENTEROGRAPHY    COLONOSCOPY W/ OR W/O BIOPSY     Will start Entyvio as planned in the hospital  She will get her first dose in the next couple days she said  I told her to taper off the Imuran down the road after a month or so  And of course would not stop the Humira and just continue with the interview and see how she does  We will proceed with a colonoscopy evaluate mucosal healing in few months      Diet/life style/natural hx /complication of the dx were all explained in details   Past medical, past surgical, social history, psychiatric history, medications or allergies, all reviewed and  updated    Thank you for allowing me to participate in the care of Ms. Miranda. For any further questions please do not hesitate to contact me. I have reviewed and agree with the ROS entered by the MA/RN. Note is dictated utilizing voice recognition software. Unfortunately this leads to occasional typographical errors. Please contact our office if you have any questions.       Leila Castillo MD  [facility]  O: #[phone]

## 2020-08-06 NOTE — TELEPHONE ENCOUNTER
Therese Maloney stated at check out that she was supposed to schedule an endoscopy after her MRI. Endoscopy to be done in October. No orders were placed and note has not yet been dictated. Please review need for endoscopy and order if necessary. Informed Therese Maloney that she will receive a return call. MRI orders given and follow up scheduled for December.

## 2020-08-11 NOTE — TELEPHONE ENCOUNTER
Case was discussed with Dr Akshat Miller. Patient is to have EGD and colonoscopy. EGD order is placed.

## 2020-08-13 ENCOUNTER — HOSPITAL ENCOUNTER (INPATIENT)
Age: 25
LOS: 2 days | Discharge: HOME | DRG: 386 | End: 2020-08-15
Attending: EMERGENCY MEDICINE | Admitting: FAMILY MEDICINE
Payer: COMMERCIAL

## 2020-08-13 ENCOUNTER — APPOINTMENT (OUTPATIENT)
Dept: CT IMAGING | Facility: CLINIC | Age: 25
DRG: 386 | End: 2020-08-13
Payer: COMMERCIAL

## 2020-08-13 PROBLEM — K50.919 CROHN'S DISEASE, UNSPECIFIED, WITH UNSPECIFIED COMPLICATIONS (HCC): Status: ACTIVE | Noted: 2020-08-13

## 2020-08-13 LAB
-: ABNORMAL
ABSOLUTE EOS #: 0 K/UL (ref 0–0.4)
ABSOLUTE IMMATURE GRANULOCYTE: ABNORMAL K/UL (ref 0–0.3)
ABSOLUTE LYMPH #: 1.8 K/UL (ref 1–4.8)
ABSOLUTE MONO #: 0.5 K/UL (ref 0.1–1.2)
ALBUMIN SERPL-MCNC: 4.6 G/DL (ref 3.5–5.2)
ALBUMIN/GLOBULIN RATIO: 1.5 (ref 1–2.5)
ALP BLD-CCNC: 37 U/L (ref 35–104)
ALT SERPL-CCNC: 18 U/L (ref 5–33)
AMORPHOUS: ABNORMAL
ANION GAP SERPL CALCULATED.3IONS-SCNC: 12 MMOL/L (ref 9–17)
AST SERPL-CCNC: 21 U/L
BACTERIA: ABNORMAL
BASOPHILS # BLD: 0 % (ref 0–2)
BASOPHILS ABSOLUTE: 0 K/UL (ref 0–0.2)
BILIRUB SERPL-MCNC: 0.4 MG/DL (ref 0.3–1.2)
BILIRUBIN URINE: NEGATIVE
BUN BLDV-MCNC: 11 MG/DL (ref 6–20)
BUN/CREAT BLD: NORMAL (ref 9–20)
CALCIUM SERPL-MCNC: 9.4 MG/DL (ref 8.6–10.4)
CASTS UA: ABNORMAL /LPF (ref 0–2)
CHLORIDE BLD-SCNC: 100 MMOL/L (ref 98–107)
CO2: 24 MMOL/L (ref 20–31)
COLOR: YELLOW
COMMENT UA: ABNORMAL
CREAT SERPL-MCNC: 0.8 MG/DL (ref 0.5–0.9)
CRYSTALS, UA: ABNORMAL /HPF
DIFFERENTIAL TYPE: ABNORMAL
EOSINOPHILS RELATIVE PERCENT: 0 % (ref 1–4)
EPITHELIAL CELLS UA: ABNORMAL /HPF (ref 0–5)
GFR AFRICAN AMERICAN: >60 ML/MIN
GFR NON-AFRICAN AMERICAN: >60 ML/MIN
GFR SERPL CREATININE-BSD FRML MDRD: NORMAL ML/MIN/{1.73_M2}
GFR SERPL CREATININE-BSD FRML MDRD: NORMAL ML/MIN/{1.73_M2}
GLUCOSE BLD-MCNC: 91 MG/DL (ref 70–99)
GLUCOSE URINE: NEGATIVE
HCG QUALITATIVE: NEGATIVE
HCT VFR BLD CALC: 43.8 % (ref 36–46)
HEMOGLOBIN: 14.7 G/DL (ref 12–16)
IMMATURE GRANULOCYTES: ABNORMAL %
KETONES, URINE: NEGATIVE
LACTIC ACID: 1 MMOL/L (ref 0.5–2.2)
LEUKOCYTE ESTERASE, URINE: ABNORMAL
LYMPHOCYTES # BLD: 15 % (ref 24–44)
MCH RBC QN AUTO: 30.8 PG (ref 26–34)
MCHC RBC AUTO-ENTMCNC: 33.6 G/DL (ref 31–37)
MCV RBC AUTO: 91.7 FL (ref 80–100)
MONOCYTES # BLD: 4 % (ref 2–11)
MUCUS: ABNORMAL
NITRITE, URINE: NEGATIVE
NRBC AUTOMATED: ABNORMAL PER 100 WBC
OTHER OBSERVATIONS UA: ABNORMAL
PDW BLD-RTO: 12.8 % (ref 12.5–15.4)
PH UA: 5.5 (ref 5–8)
PLATELET # BLD: 412 K/UL (ref 140–450)
PLATELET ESTIMATE: ABNORMAL
PMV BLD AUTO: 6.6 FL (ref 6–12)
POTASSIUM SERPL-SCNC: 3.9 MMOL/L (ref 3.7–5.3)
PROTEIN UA: NEGATIVE
RBC # BLD: 4.77 M/UL (ref 4–5.2)
RBC # BLD: ABNORMAL 10*6/UL
RBC UA: ABNORMAL /HPF (ref 0–2)
RENAL EPITHELIAL, UA: ABNORMAL /HPF
SEG NEUTROPHILS: 81 % (ref 36–66)
SEGMENTED NEUTROPHILS ABSOLUTE COUNT: 9.6 K/UL (ref 1.8–7.7)
SODIUM BLD-SCNC: 136 MMOL/L (ref 135–144)
SPECIFIC GRAVITY UA: 1.02 (ref 1–1.03)
TOTAL PROTEIN: 7.6 G/DL (ref 6.4–8.3)
TRICHOMONAS: ABNORMAL
TURBIDITY: CLEAR
URINE HGB: ABNORMAL
UROBILINOGEN, URINE: NORMAL
WBC # BLD: 12 K/UL (ref 3.5–11)
WBC # BLD: ABNORMAL 10*3/UL
WBC UA: ABNORMAL /HPF (ref 0–5)
YEAST: ABNORMAL

## 2020-08-13 PROCEDURE — 6360000004 HC RX CONTRAST MEDICATION: Performed by: EMERGENCY MEDICINE

## 2020-08-13 PROCEDURE — 85025 COMPLETE CBC W/AUTO DIFF WBC: CPT

## 2020-08-13 PROCEDURE — 83605 ASSAY OF LACTIC ACID: CPT

## 2020-08-13 PROCEDURE — 1200000000 HC SEMI PRIVATE

## 2020-08-13 PROCEDURE — 36415 COLL VENOUS BLD VENIPUNCTURE: CPT

## 2020-08-13 PROCEDURE — 80053 COMPREHEN METABOLIC PANEL: CPT

## 2020-08-13 PROCEDURE — 84703 CHORIONIC GONADOTROPIN ASSAY: CPT

## 2020-08-13 PROCEDURE — 2580000003 HC RX 258: Performed by: EMERGENCY MEDICINE

## 2020-08-13 PROCEDURE — 96374 THER/PROPH/DIAG INJ IV PUSH: CPT

## 2020-08-13 PROCEDURE — 74177 CT ABD & PELVIS W/CONTRAST: CPT

## 2020-08-13 PROCEDURE — 6360000002 HC RX W HCPCS: Performed by: EMERGENCY MEDICINE

## 2020-08-13 PROCEDURE — 81001 URINALYSIS AUTO W/SCOPE: CPT

## 2020-08-13 PROCEDURE — 96375 TX/PRO/DX INJ NEW DRUG ADDON: CPT

## 2020-08-13 PROCEDURE — 99285 EMERGENCY DEPT VISIT HI MDM: CPT

## 2020-08-13 PROCEDURE — 96361 HYDRATE IV INFUSION ADD-ON: CPT

## 2020-08-13 RX ORDER — 0.9 % SODIUM CHLORIDE 0.9 %
70 INTRAVENOUS SOLUTION INTRAVENOUS ONCE
Status: COMPLETED | OUTPATIENT
Start: 2020-08-13 | End: 2020-08-13

## 2020-08-13 RX ORDER — SODIUM CHLORIDE 0.9 % (FLUSH) 0.9 %
10 SYRINGE (ML) INJECTION PRN
Status: DISCONTINUED | OUTPATIENT
Start: 2020-08-13 | End: 2020-08-15 | Stop reason: HOSPADM

## 2020-08-13 RX ORDER — 0.9 % SODIUM CHLORIDE 0.9 %
1000 INTRAVENOUS SOLUTION INTRAVENOUS ONCE
Status: COMPLETED | OUTPATIENT
Start: 2020-08-13 | End: 2020-08-13

## 2020-08-13 RX ORDER — ONDANSETRON 2 MG/ML
4 INJECTION INTRAMUSCULAR; INTRAVENOUS ONCE
Status: COMPLETED | OUTPATIENT
Start: 2020-08-13 | End: 2020-08-13

## 2020-08-13 RX ORDER — KETOROLAC TROMETHAMINE 30 MG/ML
30 INJECTION, SOLUTION INTRAMUSCULAR; INTRAVENOUS ONCE
Status: COMPLETED | OUTPATIENT
Start: 2020-08-13 | End: 2020-08-13

## 2020-08-13 RX ADMIN — ONDANSETRON 4 MG: 2 INJECTION INTRAMUSCULAR; INTRAVENOUS at 20:23

## 2020-08-13 RX ADMIN — IOPAMIDOL 75 ML: 755 INJECTION, SOLUTION INTRAVENOUS at 21:59

## 2020-08-13 RX ADMIN — Medication 10 ML: at 21:59

## 2020-08-13 RX ADMIN — SODIUM CHLORIDE 1000 ML: 9 INJECTION, SOLUTION INTRAVENOUS at 20:22

## 2020-08-13 RX ADMIN — SODIUM CHLORIDE 70 ML: 9 INJECTION, SOLUTION INTRAVENOUS at 21:59

## 2020-08-13 RX ADMIN — KETOROLAC TROMETHAMINE 30 MG: 30 INJECTION, SOLUTION INTRAMUSCULAR at 21:15

## 2020-08-13 ASSESSMENT — PAIN SCALES - GENERAL
PAINLEVEL_OUTOF10: 7
PAINLEVEL_OUTOF10: 2
PAINLEVEL_OUTOF10: 7

## 2020-08-13 ASSESSMENT — ENCOUNTER SYMPTOMS
RESPIRATORY NEGATIVE: 1
NAUSEA: 1
BLOOD IN STOOL: 0
CONSTIPATION: 0
DIARRHEA: 0
ANAL BLEEDING: 0
ALLERGIC/IMMUNOLOGIC NEGATIVE: 1
RECTAL PAIN: 0
EYES NEGATIVE: 1
ABDOMINAL PAIN: 1

## 2020-08-13 ASSESSMENT — PAIN DESCRIPTION - LOCATION
LOCATION: ABDOMEN
LOCATION: ABDOMEN

## 2020-08-13 ASSESSMENT — PAIN DESCRIPTION - PAIN TYPE
TYPE: ACUTE PAIN
TYPE: ACUTE PAIN

## 2020-08-13 ASSESSMENT — PAIN DESCRIPTION - ORIENTATION: ORIENTATION: LOWER;MID

## 2020-08-13 ASSESSMENT — PAIN DESCRIPTION - PROGRESSION: CLINICAL_PROGRESSION: NOT CHANGED

## 2020-08-13 ASSESSMENT — PAIN DESCRIPTION - DESCRIPTORS: DESCRIPTORS: CRAMPING

## 2020-08-13 ASSESSMENT — PAIN DESCRIPTION - FREQUENCY: FREQUENCY: INTERMITTENT

## 2020-08-13 ASSESSMENT — PAIN DESCRIPTION - ONSET: ONSET: SUDDEN

## 2020-08-13 NOTE — ED PROVIDER NOTES
eMERGENCY dEPARTMENT eNCOUnter      Pt Name: Macy Eddy  MRN: [id number]  [place] 1995  Date of evaluation: 8/13/2020      CHIEF COMPLAINT       Chief Complaint   Patient presents with    Abdominal Pain     Has Chrohn's disease. Last time here she had an obstruction. Started around noon today.  Dizziness          HISTORY OF PRESENT ILLNESS    Macy Eddy is a 23 y.o. female who presents to the emergency department for evaluation of a 10-hour history of generalized abdominal pain. Patient states pain is worse on her left hand side. She further states this pain started this morning before she ate lunch and then she ate lunch and then progressively had worsening pain after that. Patient is rating her pain at a 7 out of 10 without palliative nor provocative. She has had some nausea she has had no vomiting she has had no diarrhea or constipation. She does suffer from Crohn's disease and she feels like this may be a flare. Says that last time she was here she did have a bowel obstruction. REVIEW OF SYSTEMS       Review of Systems   Constitutional: Negative. HENT: Negative. Eyes: Negative. Respiratory: Negative. Cardiovascular: Negative. Gastrointestinal: Positive for abdominal pain and nausea. Negative for anal bleeding, blood in stool, constipation, diarrhea and rectal pain. Endocrine: Negative. Genitourinary: Negative. Musculoskeletal: Negative. Skin: Negative. Allergic/Immunologic: Negative. Neurological: Negative. Hematological: Negative. Psychiatric/Behavioral: Negative. PAST MEDICAL HISTORY    has a past medical history of Anxiety, Crohn's disease of ileum ([facility] [address] Depression, and Seasonal allergies. SURGICAL HISTORY      has a past surgical history that includes Colonoscopy (09/16/2019).     CURRENT MEDICATIONS       Previous Medications    AZATHIOPRINE (IMURAN) 50 MG TABLET        BUPROPION (WELLBUTRIN XL) 150 MG EXTENDED RELEASE TABLET Take 150 mg by mouth every morning    CETIRIZINE (ZYRTEC) 10 MG TABLET    Take 1 tablet by mouth daily    CHOLECALCIFEROL (VITAMIN D) 2000 UNITS CAPS CAPSULE    Take by mouth daily    DICYCLOMINE (BENTYL) 10 MG CAPSULE    1-2 capsules prn    DOXYCYCLINE HYCLATE 50 MG TABS    Take 1 tablet daily    HYDROCORTISONE 2.5 % CREAM    Apply topically 2 times daily. INTROVALE 0.15-0.03 MG PER TABLET    TAKE ONE TABLET BY MOUTH DAILY    LAMOTRIGINE (LAMICTAL) 150 MG TABLET    200 mg     METRONIDAZOLE (METROCREAM) 0.75 % CREAM    Apply topically 2 times daily. MULTIPLE VITAMINS-MINERALS (MULTIVITAMIN PO)    Take by mouth    NONFORMULARY    daily Indications: Flouride Toothpaste  - RX    PIMECROLIMUS (ELIDEL) 1 % CREAM    Apply topically 2 times daily. PROBIOTIC PRODUCT (PROBIOTIC-10 PO)        SULFACETAMIDE-SULFUR-CLEANSER (SULFACETAMIDE SOD-SULFUR WASH) 9-4.5 % KIT    Wash face twice daily - can dispense any covered sulfacetamide wash    VEDOLIZUMAB (ENTYVIO) 300 MG INJECTION    Infuse intravenously       ALLERGIES     has No Known Allergies. FAMILY HISTORY     She indicated that her mother is alive. She indicated that her father is alive. She indicated that the status of her maternal grandfather is unknown. She indicated that the status of her maternal aunt is unknown. She indicated that the status of her neg hx is unknown.     family history includes Alcohol Abuse in her maternal aunt, maternal grandfather, and mother; Bipolar Disorder in her maternal grandfather and mother; Diabetes in her maternal grandfather; High Blood Pressure in her maternal grandfather; Other in her mother; Stroke in her maternal grandfather. SOCIAL HISTORY      reports that she has never smoked. She has never used smokeless tobacco. She reports that she does not drink alcohol or use drugs. PHYSICAL EXAM     INITIAL VITALS:  height is 5' 2\" (1.575 m) and weight is 59 kg (130 lb). Her oral temperature is 98.3 °F (36.8 °C).  Her blood pressure is 124/95 (abnormal) and her pulse is 98. Her respiration is 16 and oxygen saturation is 98%. Constitutional: Alert, oriented x3, nontoxic, afebrile, answering questions appropriately, acting properly for age, in no acute distress  HEENT: Extraocular muscles intact, mucus membranes moist, TMs clear bilaterally, no posterior pharyngeal erythema or exudates, Pupils equal, round, reactive to light,   Neck: Trachea midline, Supple without lymphadenopathy, no posterior midline neck tenderness to palpation  Cardiovascular: Regular rhythm and rate no S3, S4, or murmurs  Respiratory: Clear to auscultation bilaterally no wheezes, rhonchi, rales, no respiratory distress  Gastrointestinal: Soft,ntender, nondistended, positive hypoactive bowel sounds. No rebound, rigidity, or guarding. The belly is softly distended there are no masses appreciated. Pain is greatest on the left-hand side. Musculoskeletal: No extremity pain or swelling  Neurologic: Moving all 4 extremities without difficulty there are no gross focal neurologic deficits  Skin: Warm and dry      DIFFERENTIAL DIAGNOSIS/ MDM:     Abdominal pain of uncertain etiology, possibly a flare of patient's Crohn's disease. We will get laboratories before we decide what imaging were going to engage in as the patient has had a recent CAT scan.     DIAGNOSTIC RESULTS     EKG: All EKG's are interpreted by the Emergency Department Physician who either signs or Co-signs this chart in the absence of a cardiologist.        Not indicated unless otherwise documented above    LABS:  Results for orders placed or performed during the hospital encounter of 08/13/20   CBC Auto Differential   Result Value Ref Range    WBC 12.0 (H) 3.5 - 11.0 k/uL    RBC 4.77 4.0 - 5.2 m/uL    Hemoglobin 14.7 12.0 - 16.0 g/dL    Hematocrit 43.8 36 - 46 %    MCV 91.7 80 - 100 fL    MCH 30.8 26 - 34 pg    MCHC 33.6 31 - 37 g/dL    RDW 12.8 12.5 - 15.4 %    Platelets 305 419 - 304 k/uL MPV 6.6 6.0 - 12.0 fL    NRBC Automated NOT REPORTED per 100 WBC    Differential Type NOT REPORTED     Seg Neutrophils 81 (H) 36 - 66 %    Lymphocytes 15 (L) 24 - 44 %    Monocytes 4 2 - 11 %    Eosinophils % 0 (L) 1 - 4 %    Basophils 0 0 - 2 %    Immature Granulocytes NOT REPORTED 0 %    Segs Absolute 9.60 (H) 1.8 - 7.7 k/uL    Absolute Lymph # 1.80 1.0 - 4.8 k/uL    Absolute Mono # 0.50 0.1 - 1.2 k/uL    Absolute Eos # 0.00 0.0 - 0.4 k/uL    Basophils Absolute 0.00 0.0 - 0.2 k/uL    Absolute Immature Granulocyte NOT REPORTED 0.00 - 0.30 k/uL    WBC Morphology NOT REPORTED     RBC Morphology NOT REPORTED     Platelet Estimate NOT REPORTED    Comprehensive Metabolic Panel   Result Value Ref Range    Glucose 91 70 - 99 mg/dL    BUN 11 6 - 20 mg/dL    CREATININE 0.80 0.50 - 0.90 mg/dL    Bun/Cre Ratio NOT REPORTED 9 - 20    Calcium 9.4 8.6 - 10.4 mg/dL    Sodium 136 135 - 144 mmol/L    Potassium 3.9 3.7 - 5.3 mmol/L    Chloride 100 98 - 107 mmol/L    CO2 24 20 - 31 mmol/L    Anion Gap 12 9 - 17 mmol/L    Alkaline Phosphatase 37 35 - 104 U/L    ALT 18 5 - 33 U/L    AST 21 <32 U/L    Total Bilirubin 0.40 0.3 - 1.2 mg/dL    Total Protein 7.6 6.4 - 8.3 g/dL    Alb 4.6 3.5 - 5.2 g/dL    Albumin/Globulin Ratio 1.5 1.0 - 2.5    GFR Non-African American >60 >60 mL/min    GFR African American >60 >60 mL/min    GFR Comment          GFR Staging NOT REPORTED    Lactic Acid   Result Value Ref Range    Lactic Acid 1.0 0.5 - 2.2 mmol/L   Urinalysis Reflex to Culture    Specimen: Urine, clean catch   Result Value Ref Range    Color, UA YELLOW YELLOW    Turbidity UA CLEAR CLEAR    Glucose, Ur NEGATIVE NEGATIVE    Bilirubin Urine NEGATIVE NEGATIVE    Ketones, Urine NEGATIVE NEGATIVE    Specific Gravity, UA 1.025 1.005 - 1.030    Urine Hgb TRACE (A) NEGATIVE    pH, UA 5.5 5.0 - 8.0    Protein, UA NEGATIVE NEGATIVE    Urobilinogen, Urine Normal Normal    Nitrite, Urine NEGATIVE NEGATIVE    Leukocyte Esterase, Urine TRACE (A) NEGATIVE Urinalysis Comments NOT REPORTED    HCG Qualitative, Serum   Result Value Ref Range    hCG Qual NEGATIVE NEGATIVE   Microscopic Urinalysis   Result Value Ref Range    -          WBC, UA 0 TO 2 0 - 5 /HPF    RBC, UA 0 TO 2 0 - 2 /HPF    Casts UA NOT REPORTED 0 - 2 /LPF    Crystals, UA NOT REPORTED None /HPF    Epithelial Cells UA 0 TO 2 0 - 5 /HPF    Renal Epithelial, UA NOT REPORTED 0 /HPF    Bacteria, UA MODERATE (A) None    Mucus, UA NOT REPORTED None    Trichomonas, UA NOT REPORTED None    Amorphous, UA NOT REPORTED None    Other Observations UA (A) NOT REQ. Utilizing a urinalysis as the only screening method to exclude a potential uropathogen can be unreliable in many patient populations. Rapid screening tests are less sensitive than culture and if UTI is a clinical possibility, culture should be considered despite a negative urinalysis. Yeast, UA NOT REPORTED None       Not indicated unless otherwise documented above    RADIOLOGY:   I reviewed the radiologist interpretations:  CT ABDOMEN PELVIS W IV CONTRAST   Final Result   1. Active Crohn disease interspersed with areas of chronic partial small   bowel obstruction predominating left upper quadrant and distal ileum right   lower quadrant. 2. Trace pelvic ascites is probably reactive. 3. Other portions of the CT abdomen/pelvis appear unremarkable.              Not indicated unless otherwise documented above    EMERGENCY DEPARTMENT COURSE:     The patient was given the following medications:  Orders Placed This Encounter   Medications    ondansetron (ZOFRAN) injection 4 mg    0.9 % sodium chloride bolus    ketorolac (TORADOL) injection 30 mg    0.9 % sodium chloride bolus    iopamidol (ISOVUE-370) 76 % injection 75 mL    sodium chloride flush 0.9 % injection 10 mL        Vitals:    Vitals:    08/13/20 1925 08/13/20 1944   BP: (!) 124/95    Pulse: 98 98   Resp: 16    Temp: 98.3 °F (36.8 °C)    TempSrc: Oral    SpO2: 98%    Weight: 59 kg (130 lb) Height: 5' 2\" (1.575 m)      -------------------------  BP (!) 124/95   Pulse 98   Temp 98.3 °F (36.8 °C) (Oral)   Resp 16   Ht 5' 2\" (1.575 m)   Wt 59 kg (130 lb)   LMP 06/01/2020 (Approximate)   SpO2 98%   BMI 23.78 kg/m²         I have reviewed the disposition diagnosis with the patient and or their family/guardian. I have answered their questions and given discharge instructions. They voiced understanding of these instructions and did not have any further questions or complaints. CRITICAL CARE:    None    CONSULTS:    None    PROCEDURES:    None      OARRS Report if indicated             FINAL IMPRESSION      1. Generalized abdominal pain    2. Crohn's disease of colon with intestinal obstruction ([facility] [address]          DISPOSITION/PLAN   DISPOSITION Decision To Admit    I have reviewed the disposition diagnosis with the patient and or their family/guardian. I have answered their questions and given discharge instructions. They voiced understanding of these instructions and did not have any further questions or complaints. Patient's CAT scan showed Crohn's flare but also chronic small bowel obstructions. Patient could probably use an admission with bowel rest and a GI consultation she will be admitted to [facility].  I spoke to Anil Self who was the nurse practitioner on call for Floridadanish      11:34 PM  I spoke with the on-call GI doctor [facility] who will consult for the patient in the morning. PATIENT REFERRED TO:  No follow-up provider specified.     DISCHARGE MEDICATIONS:  New Prescriptions    No medications on file       (Please note that portions of this note were completed with a voice recognition program.  Efforts were made to edit the dictations but occasionally words are mis-transcribed.)    Veronica MD  Attending Emergency Physician    D       Rica Fitzpatrick MD  08/13/20 80 Jr Lisbet Lopez Se, III, MD  08/13/20 [id number]

## 2020-08-14 PROBLEM — F33.9 RECURRENT MAJOR DEPRESSIVE DISORDER (HCC): Status: ACTIVE | Noted: 2020-08-14

## 2020-08-14 LAB
ALBUMIN SERPL-MCNC: 3.4 G/DL (ref 3.5–5.2)
ALBUMIN/GLOBULIN RATIO: ABNORMAL (ref 1–2.5)
ALP BLD-CCNC: 30 U/L (ref 35–104)
ALT SERPL-CCNC: 16 U/L (ref 5–33)
ANION GAP SERPL CALCULATED.3IONS-SCNC: 10 MMOL/L (ref 9–17)
AST SERPL-CCNC: 17 U/L
BILIRUB SERPL-MCNC: 0.57 MG/DL (ref 0.3–1.2)
BUN BLDV-MCNC: 8 MG/DL (ref 6–20)
BUN/CREAT BLD: 11 (ref 9–20)
CALCIUM SERPL-MCNC: 8.2 MG/DL (ref 8.6–10.4)
CHLORIDE BLD-SCNC: 106 MMOL/L (ref 98–107)
CO2: 23 MMOL/L (ref 20–31)
CREAT SERPL-MCNC: 0.75 MG/DL (ref 0.5–0.9)
GFR AFRICAN AMERICAN: >60 ML/MIN
GFR NON-AFRICAN AMERICAN: >60 ML/MIN
GFR SERPL CREATININE-BSD FRML MDRD: ABNORMAL ML/MIN/{1.73_M2}
GFR SERPL CREATININE-BSD FRML MDRD: ABNORMAL ML/MIN/{1.73_M2}
GLUCOSE BLD-MCNC: 87 MG/DL (ref 70–99)
HCT VFR BLD CALC: 37.5 % (ref 36.3–47.1)
HEMOGLOBIN: 12.5 G/DL (ref 11.9–15.1)
MAGNESIUM: 2 MG/DL (ref 1.6–2.6)
MCH RBC QN AUTO: 31.3 PG (ref 25.2–33.5)
MCHC RBC AUTO-ENTMCNC: 33.3 G/DL (ref 28.4–34.8)
MCV RBC AUTO: 94 FL (ref 82.6–102.9)
NRBC AUTOMATED: 0 PER 100 WBC
PDW BLD-RTO: 11.8 % (ref 11.8–14.4)
PHOSPHORUS: 4 MG/DL (ref 2.6–4.5)
PLATELET # BLD: 321 K/UL (ref 138–453)
PMV BLD AUTO: 8.4 FL (ref 8.1–13.5)
POTASSIUM SERPL-SCNC: 4 MMOL/L (ref 3.7–5.3)
RBC # BLD: 3.99 M/UL (ref 3.95–5.11)
SODIUM BLD-SCNC: 139 MMOL/L (ref 135–144)
TOTAL PROTEIN: 5.8 G/DL (ref 6.4–8.3)
WBC # BLD: 7.4 K/UL (ref 3.5–11.3)

## 2020-08-14 PROCEDURE — 2580000003 HC RX 258: Performed by: NURSE PRACTITIONER

## 2020-08-14 PROCEDURE — 6370000000 HC RX 637 (ALT 250 FOR IP): Performed by: FAMILY MEDICINE

## 2020-08-14 PROCEDURE — 85027 COMPLETE CBC AUTOMATED: CPT

## 2020-08-14 PROCEDURE — 80053 COMPREHEN METABOLIC PANEL: CPT

## 2020-08-14 PROCEDURE — 84100 ASSAY OF PHOSPHORUS: CPT

## 2020-08-14 PROCEDURE — 2500000003 HC RX 250 WO HCPCS: Performed by: NURSE PRACTITIONER

## 2020-08-14 PROCEDURE — 99222 1ST HOSP IP/OBS MODERATE 55: CPT | Performed by: FAMILY MEDICINE

## 2020-08-14 PROCEDURE — 99254 IP/OBS CNSLTJ NEW/EST MOD 60: CPT | Performed by: INTERNAL MEDICINE

## 2020-08-14 PROCEDURE — 6370000000 HC RX 637 (ALT 250 FOR IP): Performed by: NURSE PRACTITIONER

## 2020-08-14 PROCEDURE — 83735 ASSAY OF MAGNESIUM: CPT

## 2020-08-14 PROCEDURE — 6360000002 HC RX W HCPCS: Performed by: NURSE PRACTITIONER

## 2020-08-14 PROCEDURE — APPSS60 APP SPLIT SHARED TIME 46-60 MINUTES: Performed by: NURSE PRACTITIONER

## 2020-08-14 PROCEDURE — 36415 COLL VENOUS BLD VENIPUNCTURE: CPT

## 2020-08-14 PROCEDURE — 1200000000 HC SEMI PRIVATE

## 2020-08-14 RX ORDER — MAGNESIUM SULFATE 1 G/100ML
1 INJECTION INTRAVENOUS PRN
Status: DISCONTINUED | OUTPATIENT
Start: 2020-08-14 | End: 2020-08-15 | Stop reason: HOSPADM

## 2020-08-14 RX ORDER — POTASSIUM CHLORIDE 7.45 MG/ML
10 INJECTION INTRAVENOUS PRN
Status: DISCONTINUED | OUTPATIENT
Start: 2020-08-14 | End: 2020-08-15 | Stop reason: HOSPADM

## 2020-08-14 RX ORDER — SODIUM, POTASSIUM,MAG SULFATES 17.5-3.13G
SOLUTION, RECONSTITUTED, ORAL ORAL
Qty: 1 BOTTLE | Refills: 0 | Status: SHIPPED | OUTPATIENT
Start: 2020-08-14 | End: 2020-11-04 | Stop reason: ALTCHOICE

## 2020-08-14 RX ORDER — MORPHINE SULFATE 2 MG/ML
1 INJECTION, SOLUTION INTRAMUSCULAR; INTRAVENOUS
Status: DISCONTINUED | OUTPATIENT
Start: 2020-08-14 | End: 2020-08-15 | Stop reason: HOSPADM

## 2020-08-14 RX ORDER — POTASSIUM CHLORIDE 20 MEQ/1
40 TABLET, EXTENDED RELEASE ORAL PRN
Status: DISCONTINUED | OUTPATIENT
Start: 2020-08-14 | End: 2020-08-15 | Stop reason: HOSPADM

## 2020-08-14 RX ORDER — PREDNISONE 20 MG/1
40 TABLET ORAL DAILY
Status: DISCONTINUED | OUTPATIENT
Start: 2020-08-14 | End: 2020-08-14

## 2020-08-14 RX ORDER — PROMETHAZINE HYDROCHLORIDE 12.5 MG/1
12.5 TABLET ORAL EVERY 6 HOURS PRN
Status: DISCONTINUED | OUTPATIENT
Start: 2020-08-14 | End: 2020-08-15 | Stop reason: HOSPADM

## 2020-08-14 RX ORDER — METHYLPREDNISOLONE SODIUM SUCCINATE 40 MG/ML
40 INJECTION, POWDER, LYOPHILIZED, FOR SOLUTION INTRAMUSCULAR; INTRAVENOUS DAILY
Status: DISCONTINUED | OUTPATIENT
Start: 2020-08-15 | End: 2020-08-15

## 2020-08-14 RX ORDER — SODIUM CHLORIDE 9 MG/ML
INJECTION, SOLUTION INTRAVENOUS CONTINUOUS
Status: DISCONTINUED | OUTPATIENT
Start: 2020-08-14 | End: 2020-08-15 | Stop reason: HOSPADM

## 2020-08-14 RX ORDER — ONDANSETRON 2 MG/ML
4 INJECTION INTRAMUSCULAR; INTRAVENOUS EVERY 6 HOURS PRN
Status: DISCONTINUED | OUTPATIENT
Start: 2020-08-14 | End: 2020-08-15 | Stop reason: HOSPADM

## 2020-08-14 RX ORDER — LEVONORGESTREL AND ETHINYL ESTRADIOL 0.15-0.03
1 KIT ORAL DAILY
COMMUNITY
End: 2020-11-24

## 2020-08-14 RX ORDER — ACETAMINOPHEN 650 MG/1
650 SUPPOSITORY RECTAL EVERY 6 HOURS PRN
Status: DISCONTINUED | OUTPATIENT
Start: 2020-08-14 | End: 2020-08-15 | Stop reason: HOSPADM

## 2020-08-14 RX ORDER — MORPHINE SULFATE 2 MG/ML
2 INJECTION, SOLUTION INTRAMUSCULAR; INTRAVENOUS
Status: DISCONTINUED | OUTPATIENT
Start: 2020-08-14 | End: 2020-08-15 | Stop reason: HOSPADM

## 2020-08-14 RX ORDER — ACETAMINOPHEN 325 MG/1
650 TABLET ORAL EVERY 6 HOURS PRN
Status: DISCONTINUED | OUTPATIENT
Start: 2020-08-14 | End: 2020-08-15 | Stop reason: HOSPADM

## 2020-08-14 RX ORDER — SODIUM CHLORIDE 0.9 % (FLUSH) 0.9 %
10 SYRINGE (ML) INJECTION PRN
Status: DISCONTINUED | OUTPATIENT
Start: 2020-08-14 | End: 2020-08-15 | Stop reason: HOSPADM

## 2020-08-14 RX ORDER — POLYETHYLENE GLYCOL 3350 17 G/17G
17 POWDER, FOR SOLUTION ORAL DAILY PRN
Status: DISCONTINUED | OUTPATIENT
Start: 2020-08-14 | End: 2020-08-15 | Stop reason: HOSPADM

## 2020-08-14 RX ORDER — SODIUM CHLORIDE 0.9 % (FLUSH) 0.9 %
10 SYRINGE (ML) INJECTION EVERY 12 HOURS SCHEDULED
Status: DISCONTINUED | OUTPATIENT
Start: 2020-08-14 | End: 2020-08-15 | Stop reason: HOSPADM

## 2020-08-14 RX ORDER — NICOTINE 21 MG/24HR
1 PATCH, TRANSDERMAL 24 HOURS TRANSDERMAL DAILY PRN
Status: DISCONTINUED | OUTPATIENT
Start: 2020-08-14 | End: 2020-08-15 | Stop reason: HOSPADM

## 2020-08-14 RX ORDER — SODIUM FLUORIDE 5 MG/ML
PASTE, DENTIFRICE DENTAL
COMMUNITY

## 2020-08-14 RX ADMIN — MORPHINE SULFATE 1 MG: 2 INJECTION, SOLUTION INTRAMUSCULAR; INTRAVENOUS at 23:47

## 2020-08-14 RX ADMIN — FAMOTIDINE 20 MG: 10 INJECTION, SOLUTION INTRAVENOUS at 08:53

## 2020-08-14 RX ADMIN — SODIUM CHLORIDE: 9 INJECTION, SOLUTION INTRAVENOUS at 10:28

## 2020-08-14 RX ADMIN — MORPHINE SULFATE 1 MG: 2 INJECTION, SOLUTION INTRAMUSCULAR; INTRAVENOUS at 02:47

## 2020-08-14 RX ADMIN — Medication 10 ML: at 08:54

## 2020-08-14 RX ADMIN — ACETAMINOPHEN 650 MG: 325 TABLET ORAL at 15:00

## 2020-08-14 RX ADMIN — FAMOTIDINE 20 MG: 10 INJECTION, SOLUTION INTRAVENOUS at 20:55

## 2020-08-14 RX ADMIN — ACETAMINOPHEN 650 MG: 325 TABLET ORAL at 20:54

## 2020-08-14 RX ADMIN — SODIUM CHLORIDE: 9 INJECTION, SOLUTION INTRAVENOUS at 02:46

## 2020-08-14 RX ADMIN — PREDNISONE 40 MG: 20 TABLET ORAL at 08:53

## 2020-08-14 ASSESSMENT — ENCOUNTER SYMPTOMS
EYES NEGATIVE: 1
ALLERGIC/IMMUNOLOGIC NEGATIVE: 1
ABDOMINAL DISTENTION: 0
CONSTIPATION: 0
VOMITING: 0
ABDOMINAL PAIN: 1
RECTAL PAIN: 0
NAUSEA: 0
RESPIRATORY NEGATIVE: 1
BLOOD IN STOOL: 0
ANAL BLEEDING: 0
DIARRHEA: 0

## 2020-08-14 ASSESSMENT — PAIN SCALES - GENERAL
PAINLEVEL_OUTOF10: 3
PAINLEVEL_OUTOF10: 0
PAINLEVEL_OUTOF10: 6
PAINLEVEL_OUTOF10: 6
PAINLEVEL_OUTOF10: 4
PAINLEVEL_OUTOF10: 2

## 2020-08-14 ASSESSMENT — PAIN DESCRIPTION - ONSET: ONSET: SUDDEN

## 2020-08-14 ASSESSMENT — PAIN DESCRIPTION - PAIN TYPE: TYPE: ACUTE PAIN

## 2020-08-14 ASSESSMENT — PAIN DESCRIPTION - LOCATION: LOCATION: ABDOMEN

## 2020-08-14 ASSESSMENT — PAIN DESCRIPTION - FREQUENCY: FREQUENCY: INTERMITTENT

## 2020-08-14 ASSESSMENT — PAIN DESCRIPTION - ORIENTATION: ORIENTATION: LOWER;MID

## 2020-08-14 ASSESSMENT — PAIN DESCRIPTION - DESCRIPTORS: DESCRIPTORS: CRAMPING

## 2020-08-14 NOTE — PLAN OF CARE
Problem: Safety:  Goal: Free from accidental physical injury  Description: Free from accidental physical injury  Outcome: Ongoing     Problem: Daily Care:  Goal: Daily care needs are met  Description: Daily care needs are met  Outcome: Ongoing     Problem: Pain:  Goal: Patient's pain/discomfort is manageable  Description: Patient's pain/discomfort is manageable  Outcome: Ongoing     Problem: Discharge Planning:  Goal: Patients continuum of care needs are met  Description: Patients continuum of care needs are met  Outcome: Ongoing     Problem:  Bowel/Gastric:  Goal: Bowel function will improve  Description: Bowel function will improve  Outcome: Ongoing  Goal: Diagnostic test results will improve  Description: Diagnostic test results will improve  Outcome: Ongoing  Goal: Occurrences of nausea will decrease  Description: Occurrences of nausea will decrease  Outcome: Ongoing  Goal: Occurrences of vomiting will decrease  Description: Occurrences of vomiting will decrease  Outcome: Ongoing

## 2020-08-14 NOTE — CARE COORDINATION
She is admitted with partial sbo and crohns flare. No anticipated needs.      Electronically signed by Umu Noel RN on 8/14/20 at 1:51 PM EDT

## 2020-08-14 NOTE — PROGRESS NOTES
Transitions of Care Pharmacy Service   Medication Review    The patient's list of current home medications has been reviewed and updated. Source(s) of information: Patient, [employer]    Please feel free to call with any questions about this encounter. Thank you. Estuardocameron Gayla, [facility]  Transitions of Care Pharmacy Service  Phone:  [phone]  Fax: [phone]      Prior to Admission medications    Medication Sig Start Date End Date Taking? Authorizing Provider   SODIUM FLUORIDE, DENTAL GEL, 1.1 % CREA Place onto teeth   Yes Historical Provider, MD   levonorgestrel-ethinyl estradiol (Niharika Armenta) 0.15-0.03 MG per tablet Take 1 tablet by mouth daily   Yes Historical Provider, MD   buPROPion (WELLBUTRIN XL) 150 MG extended release tablet Take 150 mg by mouth every morning   Yes Historical Provider, MD   vedolizumab (ENTYVIO) 300 MG injection Infuse 300 mg intravenously once a week    Yes Historical Provider, MD   Doxycycline Hyclate 50 MG TABS Take 1 tablet daily 8/5/20  Yes Akil Delacruz MD   Probiotic Product (PROBIOTIC-10 PO) Take 1 capsule by mouth daily    Yes Historical Provider, MD   cetirizine (ZYRTEC) 10 MG tablet Take 1 tablet by mouth daily 3/3/20  Yes Mayo Morillo MD   azaTHIOprine (IMURAN) 50 MG tablet Take 50 mg by mouth daily  9/17/19  Yes Historical Provider, MD   lamoTRIgine (LAMICTAL) 200 MG tablet Take 200 mg by mouth daily  2/26/19  Yes Historical Provider, MD   Cholecalciferol (VITAMIN D) 2000 units CAPS capsule Take 1 capsule by mouth daily    Yes Historical Provider, MD   Multiple Vitamins-Minerals (MULTIVITAMIN PO) Take 1 tablet by mouth daily    Yes Historical Provider, MD   Na Sulfate-K Sulfate-Mg Sulf 17.5-3.13-1.6 GM/177ML SOLN Use as directed in your patient instructions. 8/14/20   Jerri Bartholomew MD   metroNIDAZOLE (METROCREAM) 0.75 % cream Apply topically 2 times daily.  8/5/20 9/4/20  Akil Delacruz MD   Sulfacetamide-Sulfur-Cleanser (SULFACETAMIDE SOD-SULFUR [facility]) 9-4.5 % KIT Wash face twice daily - can dispense any covered sulfacetamide wash 8/5/20   Rowdy Renee MD   dicyclomine (BENTYL) 10 MG capsule 1-2 capsules prn 7/30/19   Historical Provider, MD   pimecrolimus (ELIDEL) 1 % cream Apply topically 2 times daily. Patient taking differently: Apply topically 2 times daily as needed Apply topically 2 times daily. 12/30/17   Izabel Lanza MD   hydrocortisone 2.5 % cream Apply topically 2 times daily. Patient taking differently: Apply topically 2 times daily as needed Apply topically 2 times daily.  3/14/17   Izabel Lanza MD

## 2020-08-14 NOTE — PLAN OF CARE
Pain level assessment complete. Patient educated on pain scale and control interventions  PRN pain medication given per patient request  Patient instructed to call out with new onset of pain or unrelieved.

## 2020-08-14 NOTE — H&P
[facility]    HISTORY AND PHYSICAL EXAMINATION            Date:   8/14/2020  Patient name:  Piter Kohler  Date of admission:  8/13/2020  7:23 PM  MRN:   [id number]  Account:  [de-identified]  YOB: 1995  PCP:    John Noel MD  Room:   1012/1012-02  Code Status:    Full Code    Chief Complaint:     Chief Complaint   Patient presents with    Abdominal Pain     Has Chrohn's disease. Last time here she had an obstruction. Started around noon today.  Dizziness       History Obtained From:     patient, electronic medical record    History of Present Illness:     Piter Kohler is a 23 y.o. Non-/non  female who presents with Abdominal Pain (Has Chrohn's disease. Last time here she had an obstruction. Started around noon today.) and Dizziness   and is admitted to the hospital for the management of Crohn's disease, unspecified, with unspecified complications ([facility] [address] Patient presented to the [place] emergency department with a chief complaint of abdominal pain that started in the epigastric region and progressed to the periumbilical region. She has a history of Crohn's disease and prior bowel obstruction. She believed her symptoms were similar to her prior bowel obstruction. She stated her pain was worse when eating. She also stated she was somewhat dizzy. She stated she had 2 bowel movements yesterday, but they were decreased compared to her usual.  She denies any nausea or vomiting. She describes the pain as cramping in nature and severe. The CT scan revealed active Crohn disease interspersed with areas of chronic partial small bowel obstruction predominating left upper quadrant and distal ileum right lower quadrant. She is being admitted for further evaluation and management of a small bowel obstruction secondary to Crohn's disease.     Past Medical History:     Past Medical History:   Diagnosis Date    Anxiety  Crohn's disease of ileum ([facility] [address]     Depression     Seasonal allergies         Past Surgical History:     Past Surgical History:   Procedure Laterality Date    COLONOSCOPY  09/16/2019        Medications Prior to Admission:     Prior to Admission medications    Medication Sig Start Date End Date Taking? Authorizing Provider   SODIUM FLUORIDE, DENTAL GEL, 1.1 % CREA Place onto teeth   Yes Historical Provider, MD   levonorgestrel-ethinyl estradiol ([place]) 0.15-0.03 MG per tablet Take 1 tablet by mouth daily   Yes Historical Provider, MD   buPROPion (WELLBUTRIN XL) 150 MG extended release tablet Take 150 mg by mouth every morning   Yes Historical Provider, MD   vedolizumab (ENTYVIO) 300 MG injection Infuse 300 mg intravenously once a week    Yes Historical Provider, MD   Doxycycline Hyclate 50 MG TABS Take 1 tablet daily 8/5/20  Yes Judi Jones MD   Probiotic Product (PROBIOTIC-10 PO) Take 1 capsule by mouth daily    Yes Historical Provider, MD   cetirizine (ZYRTEC) 10 MG tablet Take 1 tablet by mouth daily 3/3/20  Yes Raul Ramires MD   azaTHIOprine (IMURAN) 50 MG tablet Take 50 mg by mouth daily  9/17/19  Yes Historical Provider, MD   lamoTRIgine (LAMICTAL) 200 MG tablet Take 200 mg by mouth daily  2/26/19  Yes Historical Provider, MD   Cholecalciferol (VITAMIN D) 2000 units CAPS capsule Take 1 capsule by mouth daily    Yes Historical Provider, MD   Multiple Vitamins-Minerals (MULTIVITAMIN PO) Take 1 tablet by mouth daily    Yes Historical Provider, MD   Na Sulfate-K Sulfate-Mg Sulf 17.5-3.13-1.6 GM/177ML SOLN Use as directed in your patient instructions. 8/14/20   Jerri Bartholomew MD   metroNIDAZOLE (METROCREAM) 0.75 % cream Apply topically 2 times daily.  8/5/20 9/4/20  Judi Jones MD   Sulfacetamide-Sulfur-Cleanser (SULFACETAMIDE SOD-SULFUR [address] [facility]) 9-4.5 % KIT Wash face twice daily - can dispense any covered sulfacetamide wash 8/5/20   Judi Jones MD   dicyclomine (BENTYL) 10 MG capsule 1-2 capsules prn 7/30/19   Historical Provider, MD   pimecrolimus (ELIDEL) 1 % cream Apply topically 2 times daily. Patient taking differently: Apply topically 2 times daily as needed Apply topically 2 times daily. 12/30/17   John Noel MD   hydrocortisone 2.5 % cream Apply topically 2 times daily. Patient taking differently: Apply topically 2 times daily as needed Apply topically 2 times daily. 3/14/17   John Noel MD        Allergies:     Nsaids    Social History:     Tobacco:    reports that she has never smoked. She has never used smokeless tobacco.  Alcohol:      reports no history of alcohol use. Drug Use:  reports no history of drug use. Family History:     Family History   Problem Relation Age of Onset    Bipolar Disorder Mother     Other Mother     Alcohol Abuse Mother     Alcohol Abuse Maternal Aunt     Bipolar Disorder Maternal Grandfather     Diabetes Maternal Grandfather     Alcohol Abuse Maternal Grandfather     High Blood Pressure Maternal Grandfather     Stroke Maternal Grandfather     Miscarriages / Stillbirths Neg Hx     Coronary Art Dis Neg Hx     Colon Cancer Neg Hx     Uterine Cancer Neg Hx     Ovarian Cancer Neg Hx     Breast Cancer Neg Hx        Review of Systems:     Positive and Negative as described in HPI. Review of Systems   Constitutional: Negative. HENT: Negative. Eyes: Negative. Respiratory: Negative. Cardiovascular: Negative. Gastrointestinal: Positive for abdominal pain. Negative for abdominal distention, anal bleeding, blood in stool, constipation, diarrhea, nausea, rectal pain and vomiting. Endocrine: Negative. Genitourinary: Negative. Musculoskeletal: Negative. Skin: Negative. Allergic/Immunologic: Negative. Neurological: Positive for dizziness. Negative for tremors, seizures, syncope, facial asymmetry, speech difficulty, weakness, light-headedness, numbness and headaches. Hematological: Negative. Psychiatric/Behavioral: Negative. Physical Exam:   /66   Pulse 73   Temp 98.1 °F (36.7 °C) (Oral)   Resp 16   Ht 5' 2\" (1.575 m)   Wt 130 lb (59 kg)   LMP 2020 (Approximate)   SpO2 98%   BMI 23.78 kg/m²   Temp (24hrs), Av °F (36.7 °C), Min:97.1 °F (36.2 °C), Max:98.6 °F (37 °C)    No results for input(s): POCGLU in the last 72 hours. No intake or output data in the 24 hours ending 20 1539    Physical Exam  Vitals signs and nursing note reviewed. Constitutional:       Appearance: Normal appearance. She is normal weight. HENT:      Head: Normocephalic and atraumatic. Right Ear: External ear normal.      Left Ear: External ear normal.      Nose: Nose normal. No congestion or rhinorrhea. Mouth/Throat:      Mouth: Mucous membranes are moist.   Eyes:      General: No scleral icterus. Right eye: No discharge. Left eye: No discharge. Extraocular Movements: Extraocular movements intact. Conjunctiva/sclera: Conjunctivae normal.      Pupils: Pupils are equal, round, and reactive to light. Neck:      Musculoskeletal: Normal range of motion and neck supple. No neck rigidity or muscular tenderness. Vascular: No carotid bruit. Cardiovascular:      Rate and Rhythm: Normal rate and regular rhythm. Pulses: Normal pulses. Heart sounds: Normal heart sounds. No murmur. No friction rub. No gallop. Pulmonary:      Effort: Pulmonary effort is normal. No respiratory distress. Breath sounds: Normal breath sounds. No stridor. No wheezing, rhonchi or rales. Chest:      Chest wall: No tenderness. Abdominal:      General: Abdomen is flat. Bowel sounds are normal. There is no distension. Palpations: Abdomen is soft. There is no mass. Tenderness: There is abdominal tenderness. There is no guarding or rebound. Hernia: No hernia is present.    Musculoskeletal:         General: No swelling, tenderness, deformity or signs of injury. Right lower leg: No edema. Left lower leg: No edema. Lymphadenopathy:      Cervical: No cervical adenopathy. Skin:     General: Skin is warm and dry. Capillary Refill: Capillary refill takes less than 2 seconds. Coloration: Skin is not jaundiced or pale. Findings: No bruising, erythema, lesion or rash. Neurological:      General: No focal deficit present. Mental Status: She is alert and oriented to person, place, and time. Sensory: No sensory deficit. Motor: No weakness. Coordination: Coordination normal.   Psychiatric:         Mood and Affect: Mood normal.         Behavior: Behavior normal.         Investigations:      Laboratory Testing:  Recent Results (from the past 24 hour(s))   Urinalysis Reflex to Culture    Collection Time: 08/13/20  7:48 PM    Specimen: Urine, clean catch   Result Value Ref Range    Color, UA YELLOW YELLOW    Turbidity UA CLEAR CLEAR    Glucose, Ur NEGATIVE NEGATIVE    Bilirubin Urine NEGATIVE NEGATIVE    Ketones, Urine NEGATIVE NEGATIVE    Specific Gravity, UA 1.025 1.005 - 1.030    Urine Hgb TRACE (A) NEGATIVE    pH, UA 5.5 5.0 - 8.0    Protein, UA NEGATIVE NEGATIVE    Urobilinogen, Urine Normal Normal    Nitrite, Urine NEGATIVE NEGATIVE    Leukocyte Esterase, Urine TRACE (A) NEGATIVE    Urinalysis Comments NOT REPORTED    Microscopic Urinalysis    Collection Time: 08/13/20  7:48 PM   Result Value Ref Range    -          WBC, UA 0 TO 2 0 - 5 /HPF    RBC, UA 0 TO 2 0 - 2 /HPF    Casts UA NOT REPORTED 0 - 2 /LPF    Crystals, UA NOT REPORTED None /HPF    Epithelial Cells UA 0 TO 2 0 - 5 /HPF    Renal Epithelial, UA NOT REPORTED 0 /HPF    Bacteria, UA MODERATE (A) None    Mucus, UA NOT REPORTED None    Trichomonas, UA NOT REPORTED None    Amorphous, UA NOT REPORTED None    Other Observations UA (A) NOT REQ.      Utilizing a urinalysis as the only screening method to exclude a potential uropathogen can be unreliable in many patient populations. Rapid screening tests are less sensitive than culture and if UTI is a clinical possibility, culture should be considered despite a negative urinalysis.     Yeast, UA NOT REPORTED None   CBC Auto Differential    Collection Time: 08/13/20  7:55 PM   Result Value Ref Range    WBC 12.0 (H) 3.5 - 11.0 k/uL    RBC 4.77 4.0 - 5.2 m/uL    Hemoglobin 14.7 12.0 - 16.0 g/dL    Hematocrit 43.8 36 - 46 %    MCV 91.7 80 - 100 fL    MCH 30.8 26 - 34 pg    MCHC 33.6 31 - 37 g/dL    RDW 12.8 12.5 - 15.4 %    Platelets 024 324 - 141 k/uL    MPV 6.6 6.0 - 12.0 fL    NRBC Automated NOT REPORTED per 100 WBC    Differential Type NOT REPORTED     Seg Neutrophils 81 (H) 36 - 66 %    Lymphocytes 15 (L) 24 - 44 %    Monocytes 4 2 - 11 %    Eosinophils % 0 (L) 1 - 4 %    Basophils 0 0 - 2 %    Immature Granulocytes NOT REPORTED 0 %    Segs Absolute 9.60 (H) 1.8 - 7.7 k/uL    Absolute Lymph # 1.80 1.0 - 4.8 k/uL    Absolute Mono # 0.50 0.1 - 1.2 k/uL    Absolute Eos # 0.00 0.0 - 0.4 k/uL    Basophils Absolute 0.00 0.0 - 0.2 k/uL    Absolute Immature Granulocyte NOT REPORTED 0.00 - 0.30 k/uL    WBC Morphology NOT REPORTED     RBC Morphology NOT REPORTED     Platelet Estimate NOT REPORTED    Comprehensive Metabolic Panel    Collection Time: 08/13/20  7:55 PM   Result Value Ref Range    Glucose 91 70 - 99 mg/dL    BUN 11 6 - 20 mg/dL    CREATININE 0.80 0.50 - 0.90 mg/dL    Bun/Cre Ratio NOT REPORTED 9 - 20    Calcium 9.4 8.6 - 10.4 mg/dL    Sodium 136 135 - 144 mmol/L    Potassium 3.9 3.7 - 5.3 mmol/L    Chloride 100 98 - 107 mmol/L    CO2 24 20 - 31 mmol/L    Anion Gap 12 9 - 17 mmol/L    Alkaline Phosphatase 37 35 - 104 U/L    ALT 18 5 - 33 U/L    AST 21 <32 U/L    Total Bilirubin 0.40 0.3 - 1.2 mg/dL    Total Protein 7.6 6.4 - 8.3 g/dL    Alb 4.6 3.5 - 5.2 g/dL    Albumin/Globulin Ratio 1.5 1.0 - 2.5    GFR Non-African American >60 >60 mL/min    GFR African American >60 >60 mL/min    GFR Comment          GFR Staging NOT REPORTED Lactic Acid    Collection Time: 08/13/20  7:55 PM   Result Value Ref Range    Lactic Acid 1.0 0.5 - 2.2 mmol/L   HCG Qualitative, Serum    Collection Time: 08/13/20  7:55 PM   Result Value Ref Range    hCG Qual NEGATIVE NEGATIVE   Comprehensive Metabolic Panel w/ Reflex to MG    Collection Time: 08/14/20  5:46 AM   Result Value Ref Range    Glucose 87 70 - 99 mg/dL    BUN 8 6 - 20 mg/dL    CREATININE 0.75 0.50 - 0.90 mg/dL    Bun/Cre Ratio 11 9 - 20    Calcium 8.2 (L) 8.6 - 10.4 mg/dL    Sodium 139 135 - 144 mmol/L    Potassium 4.0 3.7 - 5.3 mmol/L    Chloride 106 98 - 107 mmol/L    CO2 23 20 - 31 mmol/L    Anion Gap 10 9 - 17 mmol/L    Alkaline Phosphatase 30 (L) 35 - 104 U/L    ALT 16 5 - 33 U/L    AST 17 <32 U/L    Total Bilirubin 0.57 0.3 - 1.2 mg/dL    Total Protein 5.8 (L) 6.4 - 8.3 g/dL    Alb 3.4 (L) 3.5 - 5.2 g/dL    Albumin/Globulin Ratio NOT REPORTED 1.0 - 2.5    GFR Non-African American >60 >60 mL/min    GFR African American >60 >60 mL/min    GFR Comment          GFR Staging NOT REPORTED    Magnesium    Collection Time: 08/14/20  5:46 AM   Result Value Ref Range    Magnesium 2.0 1.6 - 2.6 mg/dL   Phosphorus    Collection Time: 08/14/20  5:46 AM   Result Value Ref Range    Phosphorus 4.0 2.6 - 4.5 mg/dL   CBC    Collection Time: 08/14/20  5:46 AM   Result Value Ref Range    WBC 7.4 3.5 - 11.3 k/uL    RBC 3.99 3.95 - 5.11 m/uL    Hemoglobin 12.5 11.9 - 15.1 g/dL    Hematocrit 37.5 36.3 - 47.1 %    MCV 94.0 82.6 - 102.9 fL    MCH 31.3 25.2 - 33.5 pg    MCHC 33.3 28.4 - 34.8 g/dL    RDW 11.8 11.8 - 14.4 %    Platelets 552 488 - 976 k/uL    MPV 8.4 8.1 - 13.5 fL    NRBC Automated 0.0 0.0 per 100 WBC       Imaging/Diagnostics:  Ct Abdomen Pelvis W Iv Contrast    Result Date: 8/13/2020  1. Active Crohn disease interspersed with areas of chronic partial small bowel obstruction predominating left upper quadrant and distal ileum right lower quadrant. 2. Trace pelvic ascites is probably reactive.  3. Other portions of the CT abdomen/pelvis appear unremarkable. Assessment :      Hospital Problems           Last Modified POA    * (Principal) Crohn's disease, unspecified, with unspecified complications ([facility] [address] 3/88/4128 Yes    SBO (small bowel obstruction) ([facility] [address] 8/14/2020 Yes    [facility] 8/14/2020 Yes          Plan:     Patient status inpatient in the  [facility]    1. Admit as inpatient to [facility] under the internal medicine service  2. Consult GI for small bowel obstruction and Crohn's disease  3. IV fluids 125 mL/h  4. DVT prophylaxis: Lovenox  5. GI prophylaxis: Pepcid  6. Pain management: Morphine  7. Nausea management: Phenergan or Zofran  8. Clear liquid diet  9. Replete electrolytes as needed  10. Monitor chemistries and CBC    Consultations:   IP CONSULT TO GI    Patient is admitted as inpatient status because of co-morbidities listed above, severity of signs and symptoms as outlined, requirement for current medical therapies and most importantly because of direct risk to patient if care not provided in a hospital setting. Expected length of stay > 48 hours.     SUDHIR Ho - CNP  8/14/2020  3:39 PM    Copy sent to Dr. Tao Candelario MD

## 2020-08-14 NOTE — TELEPHONE ENCOUNTER
Writer spoke to pt over the phone informing pt, Dr Steven Walker agreed that she is to have colonoscopy and EGD in October. Pt is sched w/ [place] [facility] AT [facility] Fri 10/16/20 @ 2pm proc time, 12:30pm arrival time. Suprep order will be sent to Ket[facility] on [facility]. Bowel prep instructions given to pt over the phone and hard copy mailed to pt's home address. Pt instructed she will need a . Writer will call pt w/ covid testing date & time. Pt voices her understanding. Pt would also like Dr Steven Walker informed that she is inpt at [facility] for small bowel obstruction right now.

## 2020-08-14 NOTE — PROGRESS NOTES
Pt admitted to room 1012 from [address] to room and call light/tv controls. Bed in lowest position, wheels locked, 2/4 side rails up  Call light in reach, room free of clutter, adequate lighting provided.

## 2020-08-14 NOTE — CONSULTS
INITIAL CONSULTATION     HISTORY OF PRESENT ILLNESS: Leon Kenny is a 23 y.o. female with a past history remarkable for Crohn's disease, admitted to the hospital on 8/13/2020 for abdominal pain. She was found to have small bowel obstruction. She has had similar issues in the past.  She has had abdominal problems for around 7 years. She has known Crohn's disease involving the small bowel. She was initially treated with Imuran. Subsequently Humira. She was admitted to the hospital several months after treatment with Humira. She was started on Entyvio last week. In the emergency room she was noted to have dilated small bowel by CT scan. Her bowels moved yesterday. No bowel movement today. She denies any nausea or vomiting today. She is doing okay on clear liquid diet. She wants to try full liquid diet. She denies using any NSAIDs. No smoking.      PAST MEDICAL HISTORY:     Past Medical History:   Diagnosis Date    Anxiety     Crohn's disease of ileum ([address]     Depression     Seasonal allergies         Past Surgical History:   Procedure Laterality Date    COLONOSCOPY  09/16/2019          CURRENT MEDICATIONS:       Current Facility-Administered Medications:     sodium chloride flush 0.9 % injection 10 mL, 10 mL, Intravenous, 2 times per day, Yvonne Satchel, APRN - CNP, 10 mL at 08/14/20 0854    sodium chloride flush 0.9 % injection 10 mL, 10 mL, Intravenous, PRN, Yvonne Satchel, APRN - CNP    potassium chloride (KLOR-CON M) extended release tablet 40 mEq, 40 mEq, Oral, PRN **OR** potassium bicarb-citric acid (EFFER-K) effervescent tablet 40 mEq, 40 mEq, Oral, PRN **OR** potassium chloride 10 mEq/100 mL IVPB (Peripheral Line), 10 mEq, Intravenous, PRN, Yvonne Satchel, APRN - CNP    magnesium sulfate 1 g in dextrose 5% 100 mL IVPB, 1 g, Intravenous, PRN, Yvonne Satchel, APRN - CNP    acetaminophen (TYLENOL) tablet 650 mg, 650 mg, Oral, Q6H PRN, 650 mg at 08/14/20 1500 **OR** acetaminophen (TYLENOL) suppository 650 mg, 650 mg, Rectal, Q6H PRN, Jostin Bullocks, APRN - CNP    polyethylene glycol (GLYCOLAX) packet 17 g, 17 g, Oral, Daily PRN, Wellfleet Bullocks, APRN - CNP    promethazine (PHENERGAN) tablet 12.5 mg, 12.5 mg, Oral, Q6H PRN **OR** ondansetron (ZOFRAN) injection 4 mg, 4 mg, Intravenous, Q6H PRN, Jostin Bullocks, APRN - CNP    famotidine (PEPCID) injection 20 mg, 20 mg, Intravenous, BID, Jostin Bullocks, APRN - CNP, 20 mg at 08/14/20 0853    nicotine (NICODERM CQ) 21 MG/24HR 1 patch, 1 patch, Transdermal, Daily PRN, Jostin Bullocks, APRN - CNP    enoxaparin (LOVENOX) injection 40 mg, 40 mg, Subcutaneous, Daily, [place]fleet Bullocks, APRN - CNP    0.9 % sodium chloride infusion, , Intravenous, Continuous, [place]fle[place] Bullocks, APRN - CNP, Last Rate: 125 mL/hr at 08/14/20 1028    morphine (PF) injection 1 mg, 1 mg, Intravenous, Q2H PRN, 1 mg at 08/14/20 0247 **OR** morphine (PF) injection 2 mg, 2 mg, Intravenous, Q2H PRN, Keily Rodriguez, APRN - CNP    predniSONE (DELTASONE) tablet 40 mg, 40 mg, Oral, Daily, Kuldeep Frias MD, 40 mg at 08/14/20 0853    sodium chloride flush 0.9 % injection 10 mL, 10 mL, Intravenous, PRN, April Bloom III, MD, 10 mL at 08/13/20 5088       ALLERGIES:   Allergies   Allergen Reactions    Nsaids      NO NSAIDs b/c of Crohns. FAMILY HISTORY: The patient's family history was reviewed.         SOCIAL HISTORY:   Social History     Socioeconomic History    Marital status:      Spouse name: Not on file    Number of children: Not on file    Years of education: Not on file    Highest education level: Not on file   Occupational History    Not on file   Social Needs    Financial resource strain: Not on file    Food insecurity     Worry: Not on file     Inability: Not on file    Transportation needs     Medical: Not on file     Non-medical: Not on file   Tobacco Use    Smoking status: Never Smoker    Smokeless tobacco: Never Used   Substance and Sexual Activity    Alcohol use: No     Alcohol/week: 0.0 standard drinks    Drug use: No    Sexual activity: Not Currently     Partners: Male     Birth control/protection: Condom   Lifestyle    Physical activity     Days per week: Not on file     Minutes per session: Not on file    Stress: Not on file   Relationships    Social connections     Talks on phone: Not on file     Gets together: Not on file     Attends Restorationism service: Not on file     Active member of club or organization: Not on file     Attends meetings of clubs or organizations: Not on file     Relationship status: Not on file    Intimate partner violence     Fear of current or ex partner: Not on file     Emotionally abused: Not on file     Physically abused: Not on file     Forced sexual activity: Not on file   Other Topics Concern    Not on file   Social History Narrative    Not on file         REVIEW OF SYSTEMS: A 14-point review of systems was obtained and pertinent positives andnegatives were enumerated above in the history of present illness. All other reviewed systems / symptoms were negative. Review of Systems      PHYSICAL EXAMINATION: Vital signs reviewed per the nursing documentation. /66   Pulse 73   Temp 98.1 °F (36.7 °C) (Oral)   Resp 16   Ht 5' 2\" (1.575 m)   Wt 130 lb (59 kg)   LMP 06/01/2020 (Approximate)   SpO2 98%   BMI 23.78 kg/m²    [unfilled]   Body mass index is 23.78 kg/m². General:  A O x 3 in mild distress   Psych: . Normal affect. Mentation normal   HEENT: PERRLA. Clear conjunctivae and sclerae. Moist oral mucosae, no lesions orulcers. The neck is supple, without lymphadenopathy or jugular venous distension. No masses. Normal thyroid. Cardiovascular: S1 S2 RRR no rubs or murmurs. Pulmonary: clear BL. No accessory muscle usage. Exam: Soft, NT ND, no hepato or spleno megaly, +BS, no ascites. No groin masses or lymphadenopathy. Extremities: No edema. Skin: Warm skin. No skin rash. No spider nevi palmar erythema naildystrophy. Joint: No joint swelling or deformity. Neurological: intact sensory. DTR+. No asterixis     LABORATORY DATA: Reviewed   Lab Results   Component Value Date    WBC 7.4 08/14/2020    HGB 12.5 08/14/2020    HCT 37.5 08/14/2020    MCV 94.0 08/14/2020     08/14/2020     08/14/2020    K 4.0 08/14/2020     08/14/2020    CO2 23 08/14/2020    BUN 8 08/14/2020    CREATININE 0.75 08/14/2020    LABALBU 3.4 (L) 08/14/2020    BILITOT 0.57 08/14/2020    ALKPHOS 30 (L) 08/14/2020    AST 17 08/14/2020    ALT 16 08/14/2020      Lab Results   Component Value Date    RBC 3.99 08/14/2020    HGB 12.5 08/14/2020    MCV 94.0 08/14/2020    MCH 31.3 08/14/2020    MCHC 33.3 08/14/2020    RDW 11.8 08/14/2020    MPV 8.4 08/14/2020    BASOPCT 0 08/13/2020    LYMPHSABS 1.80 08/13/2020    MONOSABS 0.50 08/13/2020    NEUTROABS 9.60 (H) 08/13/2020    EOSABS 0.00 08/13/2020    BASOSABS 0.00 08/13/2020          DIAGNOSTIC TESTING:   Ct Abdomen Pelvis W Iv Contrast    Result Date: 8/13/2020  EXAMINATION: CT OF THE ABDOMEN AND PELVIS WITH CONTRAST 8/13/2020 9:50 pm TECHNIQUE: CT of the abdomen and pelvis was performed with the administration of intravenous contrast. Multiplanar reformatted images are provided for review. Dose modulation, iterative reconstruction, and/or weight based adjustment of the mA/kV was utilized to reduce the radiation dose to as low as reasonably achievable. COMPARISON: CT abdomen and pelvis 05/30/2020 HISTORY: ORDERING SYSTEM PROVIDED HISTORY: Pain, Crohn disease. TECHNOLOGIST PROVIDED HISTORY: IV Only Contrast Pain, rule out obstruction Reason for Exam: Abd pain periumbilical, cramping, onset around noon. Hx of obstruction Acuity: Acute Type of Exam: Initial FINDINGS: Lower Chest: Visualized portions of the lungs are clear. Cardiac and posterior mediastinal structures visualized are unremarkable. Organs: Normal attenuation throughout the liver. No discrete hepatic lesion or intrahepatic bile duct dilatation is seen. The gallbladder, kidneys, spleen, adrenal glands and pancreas appear unremarkable. GI/Bowel: Multifocal skip lesions of small bowel wall thickening with interspersed areas of chronically dilated small bowel loops, most dilated within the pelvis measuring as great as 5.1 cm. Most prominent thickening and inflammatory change is seen in the left upper quadrant. Unremarkable appearance of the stomach and colon. Pelvis: The uterus and adnexal structures appear unremarkable. Urinary bladder is partially filled, unremarkable appearance. No adenopathy. Trace pelvic ascites. Peritoneum/Retroperitoneum: Unremarkable appearance of the aorta. No aneurysm. Unremarkable appearance of the IVC. No adenopathy or fluid. Bones/Soft Tissues: No acute superficial soft tissue or osseous structure abnormality evident. 1. Active Crohn disease interspersed with areas of chronic partial small bowel obstruction predominating left upper quadrant and distal ileum right lower quadrant. 2. Trace pelvic ascites is probably reactive. 3. Other portions of the CT abdomen/pelvis appear unremarkable. IMPRESSION: Ms. Cinthia Greenfield is a 23 y.o. female with Crohn's disease. Small bowel obstruction. Inflamed small bowel by CT scan. She was just started on Entyvio last week. We will treat with IV steroids. Solu-Medrol 40 mg/day. She may benefit from a taper of Entocort until interview reached its optimal effect. May try a full liquid diet tonight. Thank you for allowing me to participate in the care of Ms. Miranda. For any further questions please do not hesitate to contact me.        Jorge Calix MD [facility]

## 2020-08-15 VITALS
HEIGHT: 62 IN | TEMPERATURE: 98.8 F | WEIGHT: 141.7 LBS | OXYGEN SATURATION: 98 % | SYSTOLIC BLOOD PRESSURE: 111 MMHG | HEART RATE: 83 BPM | BODY MASS INDEX: 26.07 KG/M2 | DIASTOLIC BLOOD PRESSURE: 65 MMHG | RESPIRATION RATE: 15 BRPM

## 2020-08-15 PROCEDURE — 99232 SBSQ HOSP IP/OBS MODERATE 35: CPT | Performed by: INTERNAL MEDICINE

## 2020-08-15 PROCEDURE — 6360000002 HC RX W HCPCS: Performed by: NURSE PRACTITIONER

## 2020-08-15 PROCEDURE — 2580000003 HC RX 258: Performed by: NURSE PRACTITIONER

## 2020-08-15 PROCEDURE — 99232 SBSQ HOSP IP/OBS MODERATE 35: CPT | Performed by: FAMILY MEDICINE

## 2020-08-15 PROCEDURE — 2500000003 HC RX 250 WO HCPCS: Performed by: NURSE PRACTITIONER

## 2020-08-15 PROCEDURE — 6370000000 HC RX 637 (ALT 250 FOR IP): Performed by: NURSE PRACTITIONER

## 2020-08-15 RX ORDER — FAMOTIDINE 20 MG/1
20 TABLET, FILM COATED ORAL 2 TIMES DAILY
Qty: 60 TABLET | Refills: 3 | Status: ON HOLD | OUTPATIENT
Start: 2020-08-15 | End: 2020-11-26

## 2020-08-15 RX ORDER — PREDNISONE 20 MG/1
20 TABLET ORAL 2 TIMES DAILY
Status: DISCONTINUED | OUTPATIENT
Start: 2020-08-15 | End: 2020-08-15 | Stop reason: HOSPADM

## 2020-08-15 RX ORDER — PREDNISONE 10 MG/1
TABLET ORAL
Qty: 10 TABLET | Refills: 0 | Status: SHIPPED | OUTPATIENT
Start: 2020-08-15 | End: 2020-10-03

## 2020-08-15 RX ORDER — FAMOTIDINE 20 MG/1
20 TABLET, FILM COATED ORAL 2 TIMES DAILY
Status: DISCONTINUED | OUTPATIENT
Start: 2020-08-15 | End: 2020-08-15 | Stop reason: HOSPADM

## 2020-08-15 RX ADMIN — PREDNISONE 20 MG: 20 TABLET ORAL at 14:11

## 2020-08-15 RX ADMIN — METHYLPREDNISOLONE SODIUM SUCCINATE 40 MG: 40 INJECTION, POWDER, FOR SOLUTION INTRAMUSCULAR; INTRAVENOUS at 08:45

## 2020-08-15 RX ADMIN — Medication 10 ML: at 08:45

## 2020-08-15 RX ADMIN — MORPHINE SULFATE 1 MG: 2 INJECTION, SOLUTION INTRAMUSCULAR; INTRAVENOUS at 06:35

## 2020-08-15 RX ADMIN — FAMOTIDINE 20 MG: 10 INJECTION, SOLUTION INTRAVENOUS at 08:45

## 2020-08-15 RX ADMIN — ACETAMINOPHEN 650 MG: 325 TABLET ORAL at 09:04

## 2020-08-15 ASSESSMENT — ENCOUNTER SYMPTOMS
WHEEZING: 0
BLOOD IN STOOL: 0
CHEST TIGHTNESS: 0
SHORTNESS OF BREATH: 0
NAUSEA: 0
RHINORRHEA: 0
DIARRHEA: 0
ABDOMINAL PAIN: 0
COUGH: 0
CONSTIPATION: 0
VOMITING: 0

## 2020-08-15 ASSESSMENT — PAIN SCALES - GENERAL
PAINLEVEL_OUTOF10: 4
PAINLEVEL_OUTOF10: 5
PAINLEVEL_OUTOF10: 0
PAINLEVEL_OUTOF10: 3
PAINLEVEL_OUTOF10: 4

## 2020-08-15 NOTE — PROGRESS NOTES
GI Progress notes    8/15/2020   7:57 PM    Name:  Finn Encarnacion  MRN:    [id number]     Acct:     [de-identified]   Room:  [address] [place] [address] Day: 2     Admit Date: 8/13/2020  7:23 PM  PCP: Trista Espitia MD    Subjective:     C/C:   Chief Complaint   Patient presents with    Abdominal Pain     Has Chrohn's disease. Last time here she had an obstruction. Started around noon today.  Dizziness       Interval History: Status: improved. Patient seen and examined. No acute events overnight. Patient has marked improvement of abdominal pain. Tolerating diet. No fevers, chills. Passing flatus. Had normal bowel movement today. Labs and progress notes reviewed. ROS:  Constitutional: negative for chills, fevers and sweats  Gastrointestinal: negative for abdominal pain, constipation, diarrhea, nausea and vomiting    Medications: Allergies: Allergies   Allergen Reactions    Nsaids      NO NSAIDs b/c of Crohns. Current Meds: No current facility-administered medications for this encounter.        Data:     Code Status:  Prior    Family History   Problem Relation Age of Onset    Bipolar Disorder Mother     Other Mother     Alcohol Abuse Mother     Alcohol Abuse Maternal Aunt     Bipolar Disorder Maternal Grandfather     Diabetes Maternal Grandfather     Alcohol Abuse Maternal Grandfather     High Blood Pressure Maternal Grandfather     Stroke Maternal Grandfather     Miscarriages / Stillbirths Neg Hx     Coronary Art Dis Neg Hx     Colon Cancer Neg Hx     Uterine Cancer Neg Hx     Ovarian Cancer Neg Hx     Breast Cancer Neg Hx        Social History     Socioeconomic History    Marital status:      Spouse name: Not on file    Number of children: Not on file    Years of education: Not on file    Highest education level: Not on file   Occupational History    Not on file   Social Needs    Financial resource strain: Not on file    Food insecurity     Worry: Not on file Inability: Not on file    Transportation needs     Medical: Not on file     Non-medical: Not on file   Tobacco Use    Smoking status: Never Smoker    Smokeless tobacco: Never Used   Substance and Sexual Activity    Alcohol use: No     Alcohol/week: 0.0 standard drinks    Drug use: No    Sexual activity: Not Currently     Partners: Male     Birth control/protection: Condom   Lifestyle    Physical activity     Days per week: Not on file     Minutes per session: Not on file    Stress: Not on file   Relationships    Social connections     Talks on phone: Not on file     Gets together: Not on file     Attends Sikh service: Not on file     Active member of club or organization: Not on file     Attends meetings of clubs or organizations: Not on file     Relationship status: Not on file    Intimate partner violence     Fear of current or ex partner: Not on file     Emotionally abused: Not on file     Physically abused: Not on file     Forced sexual activity: Not on file   Other Topics Concern    Not on file   Social History Narrative    Not on file       Vitals:  /65   Pulse 83   Temp 98.8 °F (37.1 °C) (Oral)   Resp 15   Ht 5' 2\" (1.575 m)   Wt 141 lb 11.2 oz (64.3 kg)   LMP 2020 (Approximate)   SpO2 98%   BMI 25.92 kg/m²   Temp (24hrs), Av.7 °F (37.1 °C), Min:98.6 °F (37 °C), Max:98.8 °F (37.1 °C)    No results for input(s): POCGLU in the last 72 hours. I/O (24Hr):     Intake/Output Summary (Last 24 hours) at 8/15/2020 1957  Last data filed at 8/15/2020 0640  Gross per 24 hour   Intake 3000 ml   Output --   Net 3000 ml       Labs:      CBC:   Lab Results   Component Value Date    WBC 7.4 2020    RBC 3.99 2020    HGB 12.5 2020    HCT 37.5 2020    MCV 94.0 2020    MCH 31.3 2020    MCHC 33.3 2020    RDW 11.8 2020     2020    MPV 8.4 2020     CBC with Differential:    Lab Results   Component Value Date    WBC 7.4 08/14/2020    RBC 3.99 08/14/2020    HGB 12.5 08/14/2020    HCT 37.5 08/14/2020     08/14/2020    MCV 94.0 08/14/2020    MCH 31.3 08/14/2020    MCHC 33.3 08/14/2020    RDW 11.8 08/14/2020    LYMPHOPCT 15 08/13/2020    MONOPCT 4 08/13/2020    BASOPCT 0 08/13/2020    MONOSABS 0.50 08/13/2020    LYMPHSABS 1.80 08/13/2020    EOSABS 0.00 08/13/2020    BASOSABS 0.00 08/13/2020    DIFFTYPE NOT REPORTED 08/13/2020     Hemoglobin/Hematocrit:    Lab Results   Component Value Date    HGB 12.5 08/14/2020    HCT 37.5 08/14/2020     CMP:    Lab Results   Component Value Date     08/14/2020    K 4.0 08/14/2020     08/14/2020    CO2 23 08/14/2020    BUN 8 08/14/2020    CREATININE 0.75 08/14/2020    GFRAA >60 08/14/2020    LABGLOM >60 08/14/2020    GLUCOSE 87 08/14/2020    PROT 5.8 08/14/2020    LABALBU 3.4 08/14/2020    CALCIUM 8.2 08/14/2020    BILITOT 0.57 08/14/2020    ALKPHOS 30 08/14/2020    AST 17 08/14/2020    ALT 16 08/14/2020     BMP:    Lab Results   Component Value Date     08/14/2020    K 4.0 08/14/2020     08/14/2020    CO2 23 08/14/2020    BUN 8 08/14/2020    LABALBU 3.4 08/14/2020    CREATININE 0.75 08/14/2020    CALCIUM 8.2 08/14/2020    GFRAA >60 08/14/2020    LABGLOM >60 08/14/2020    GLUCOSE 87 08/14/2020     PT/INR:  No results found for: PROTIME, INR  PTT:  No results found for: APTT, PTT[APTT}    Physical Examination:        General appearance: alert, cooperative and no distress  Mental Status: oriented to person, place and time and normal affect  Abdomen: soft, nontender, nondistended, bowel sounds present     Assessment:        Primary Problem  Crohn's disease, unspecified, with unspecified complications [facility])     Active Hospital Problems    Diagnosis Date Noted    Recurrent major depressive disorder ([facility] [F33.9] 08/14/2020    Generalized abdominal pain [R10.84]     Crohn's disease of colon with intestinal obstruction ([facility] [K50.112]     Crohn's disease, unspecified, with

## 2020-08-15 NOTE — PROGRESS NOTES
[facility] - INPATIENT      Daily Progress Note     Admit Date: 8/13/2020  Bed/Room No.  1012/1012-02  Admitting Physician : Mable Lai MD  Code Status :[address] Day:  LOS: 2 days   Chief Complaint:     Chief Complaint   Patient presents with    Abdominal Pain     Has Chrohn's disease. Last time here she had an obstruction. Started around noon today.  Dizziness     Principal Problem:    Crohn's disease, unspecified, with unspecified complications ([address]  Active Problems:    SBO (small bowel obstruction) (HCC)    Recurrent major depressive disorder (HCC)    Generalized abdominal pain    Crohn's disease of colon with intestinal obstruction ([address]  Resolved Problems:    * No resolved hospital problems. *    Subjective : Interval History/Significant events :  08/15/20    Patient reports improvement in abdominal pain. She has abdominal pain and right lower quadrant without radiation. Patient denies associated nausea, vomiting. She had normal bowel movement today. Vitals - Stable afebrile  Labs -no new labs. Nursing notes , Consults notes reviewed. Overnight events and updates discussed with Nursing staff . Background History:         Eliana Toribio is 23 y.o. female  Who was admitted to the hospital on 8/13/2020 for treatment of Crohn's disease, unspecified, with unspecified complications ([facility] Patient with known history of Crohn's disease diagnosed in September 2019 presented with diffuse abdominal pain without any diarrhea, blood in stool, fever. Evaluation showed active Crohn's disease interspersed with chronic partial small bowel obstruction and left upper quadrant and distal ileum. Patient was recently started on Entyvio about a week ago. Patient was treated with IV steroids, bowel rest.    Patient had bowel movement with improvement in abdominal pain. Diet was started slowly and advanced.       PMH:  Past Medical History:   Diagnosis Date    Anxiety     Crohn's disease of ileum ([facility]     Depression     Seasonal allergies       Allergies: Allergies   Allergen Reactions    Nsaids      NO NSAIDs b/c of Crohns. Medications :  sodium chloride flush, 10 mL, Intravenous, 2 times per day  famotidine (PEPCID) injection, 20 mg, Intravenous, BID  enoxaparin, 40 mg, Subcutaneous, Daily  methylPREDNISolone, 40 mg, Intravenous, Daily        Review of Systems   Review of Systems   Constitutional: Negative for appetite change, fatigue, fever and unexpected weight change. HENT: Negative for congestion, rhinorrhea and sneezing. Eyes: Negative for visual disturbance. Respiratory: Negative for cough, chest tightness, shortness of breath and wheezing. Cardiovascular: Negative for chest pain and palpitations. Gastrointestinal: Negative for abdominal pain, blood in stool, constipation, diarrhea, nausea and vomiting. Genitourinary: Negative for dysuria, enuresis, frequency and hematuria. Musculoskeletal: Negative for arthralgias and myalgias. Skin: Negative for rash. Neurological: Negative for dizziness, weakness, light-headedness and headaches. Hematological: Does not bruise/bleed easily. Psychiatric/Behavioral: Negative for dysphoric mood and sleep disturbance. Objective :      Current Vitals : Temp: 98.6 °F (37 °C),  Pulse: 90, Resp: 18, BP: 123/60, SpO2: 98 %  Last 24 Hrs Vitals   Patient Vitals for the past 24 hrs:   BP Temp Temp src Pulse Resp SpO2 Weight   08/15/20 0529 123/60 98.6 °F (37 °C) Oral 90 18 98 % --   08/15/20 0458 -- -- -- -- -- -- 141 lb 11.2 oz (64.3 kg)   08/14/20 1951 116/76 98.7 °F (37.1 °C) Oral 91 16 97 % --   08/14/20 1220 112/66 98.1 °F (36.7 °C) Oral 73 16 98 % --   08/14/20 0855 111/65 97.7 °F (36.5 °C) Oral 80 16 96 % --     Intake / output   08/14 0701 - 08/15 0700  In: 3000 [I.V.:3000]  Out: -   Physical Exam:  Physical Exam  Vitals signs and nursing note reviewed.    Constitutional:       General: She is not in acute distress. Appearance: She is not diaphoretic. HENT:      Head: Normocephalic and atraumatic. Nose:      Right Sinus: No maxillary sinus tenderness or frontal sinus tenderness. Left Sinus: No maxillary sinus tenderness or frontal sinus tenderness. Mouth/Throat:      Pharynx: No oropharyngeal exudate. Eyes:      General: No scleral icterus. Conjunctiva/sclera: Conjunctivae normal.      Pupils: Pupils are equal, round, and reactive to light. Neck:      Musculoskeletal: Full passive range of motion without pain and neck supple. Thyroid: No thyromegaly. Vascular: No JVD. Cardiovascular:      Rate and Rhythm: Normal rate and regular rhythm. Pulses:           Dorsalis pedis pulses are 2+ on the right side and 2+ on the left side. Heart sounds: Normal heart sounds. No murmur. Pulmonary:      Effort: Pulmonary effort is normal.      Breath sounds: Normal breath sounds. No wheezing or rales. Abdominal:      Palpations: Abdomen is soft. There is no mass. Tenderness: There is abdominal tenderness in the right lower quadrant. Lymphadenopathy:      Head:      Right side of head: No submandibular adenopathy. Left side of head: No submandibular adenopathy. Cervical: No cervical adenopathy. Skin:     General: Skin is warm. Neurological:      Mental Status: She is alert and oriented to person, place, and time. Motor: No tremor. Psychiatric:         Behavior: Behavior is cooperative.            Laboratory findings:    Recent Labs     08/13/20 1955 08/14/20  0546   WBC 12.0* 7.4   HGB 14.7 12.5   HCT 43.8 37.5    321     Recent Labs     08/13/20 1955 08/14/20  0546    139   K 3.9 4.0    106   CO2 24 23   GLUCOSE 91 87   BUN 11 8   CREATININE 0.80 0.75   MG  --  2.0   CALCIUM 9.4 8.2*   PHOS  --  4.0     Recent Labs     08/13/20 1955 08/14/20  0546   PROT 7.6 5.8*   LABALBU 4.6 3.4*   AST 21 17   ALT 18 16   ALKPHOS 37 30*   BILITOT 0. 40 0.57          Specific Gravity, UA   Date Value Ref Range Status   08/13/2020 1.025 1.005 - 1.030 Final     Protein, UA   Date Value Ref Range Status   08/13/2020 NEGATIVE NEGATIVE Final     RBC, UA   Date Value Ref Range Status   08/13/2020 0 TO 2 0 - 2 /HPF Final     Bacteria, UA   Date Value Ref Range Status   08/13/2020 MODERATE (A) None Final     Nitrite, Urine   Date Value Ref Range Status   08/13/2020 NEGATIVE NEGATIVE Final     WBC, UA   Date Value Ref Range Status   08/13/2020 0 TO 2 0 - 5 /HPF Final     Leukocyte Esterase, Urine   Date Value Ref Range Status   08/13/2020 TRACE (A) NEGATIVE Final       Imaging / Clinical Data :-   Ct Abdomen Pelvis W Iv Contrast    Result Date: 8/13/2020  1. Active Crohn disease interspersed with areas of chronic partial small bowel obstruction predominating left upper quadrant and distal ileum right lower quadrant. 2. Trace pelvic ascites is probably reactive. 3. Other portions of the CT abdomen/pelvis appear unremarkable. Clinical Course : stable  Assessment and Plan  :        1. Acute Crohn's colitis -treated with IV steroids. Patient had bowel movement. Pain control. Discharged on oral prednisone with slow taper. Pepcid for GI prophylaxis. Follow-up with Dr. Akshat Miller. Discussed with GI attending Dr. Kamila Ta. Plan and updates discussed with patient ,  answers  explained to satisfaction.    Plan discussed with Staff Edgar Webber     (Please note that portions of this note were completed with a voice recognition program. Efforts were made to edit the dictations but occasionally words are mis-transcribed.)      Yuly Khalil MD  8/15/2020

## 2020-08-15 NOTE — PROGRESS NOTES
Comprehensive Nutrition Assessment    Type and Reason for Visit:  Initial, Patient Education(Patient requested to see dietitian)    Nutrition Recommendations/Plan: Continue Low- Fiber diet    Nutrition Assessment:  Patient admission is related to Crohn's disease. Patient requested to see dietitian and asked for information on a low fiber diet. Reviewed and discussed various foods and provided Low-Fiber Nutrition Therapy handout and IBS, Crohn's disea and UC Nutrition Therapy handout. Patient's questions answered. Encouraged patient to keep a food log/ journal.    Malnutrition Assessment:  Malnutrition Status:  No malnutrition        Estimated Daily Nutrient Needs:  Energy (kcal):  6245-0195 kcal based on 22-24 kcal/kg; Weight Used for Energy Requirements:  Current     Protein (g):  60-84 gm/kg based on 1.2-1.4 gm/kg; Weight Used for Protein Requirements:  Ideal        Fluid (ml/day):   ; Weight Used for Fluid Requirements:         Nutrition Related Findings:  Crohn's disease      Wounds:  None       Current Nutrition Therapies:    DIET LOW FIBER; Anthropometric Measures:  · Height: 5' 2\" (157.5 cm)  · Current Body Weight: 141 lb (64 kg)   · Ideal Body Weight: 110 lbs; % Ideal Body Weight 128.2 %   · BMI: 25.8   · BMI Categories: Overweight (BMI 25.0-29. 9)       Nutrition Diagnosis:   · Altered GI function related to altered GI function(Crohn's disease) as evidenced by GI abnormality, constipation(recent small bowel obstruction, bowel inflammation)      Nutrition Interventions:   Food and/or Nutrient Delivery:  Continue Current Diet  Nutrition Education/Counseling:  Education completed   Coordination of Nutrition Care:  Continued Inpatient Monitoring    Goals:  PO intakes are greater than 75% at meals       Nutrition Monitoring and Evaluation:  Food/Nutrient Intake Outcomes:  Food and Nutrient Intake  Physical Signs/Symptoms Outcomes:  Biochemical Data, Skin, Weight, Constipation     Discharge Planning: Continue current diet       Some areas of assessment maybe incomplete due to COVID-19 precautions.     Yaw PAREDES R.D, L.D,  Clinical Dietitian

## 2020-08-15 NOTE — DISCHARGE SUMMARY
[facility]      Discharge Summary     Patient ID: Moises Sanderson  :  1995   MRN: [id number]     ACCOUNT:  [de-identified]   Patient Location : [facility]  Patient's PCP: Brooke Ferrer MD  Admit Date: 2020   Discharge Date: 8/15/2020     Length of Stay: 2  Code Status:  Full Code  Admitting Physician: Carson Soto MD  Discharge Physician: Carson Soto MD     Active Discharge Diagnosis :     Primary Problem  Crohn's disease, unspecified, with unspecified complications [facility])      [place] Problems    Diagnosis Date Noted    Recurrent major depressive disorder ([address] [F33.9] 2020    Generalized abdominal pain [R10.84]     Crohn's disease of colon with intestinal obstruction ([address] [K50.112]     Crohn's disease, unspecified, with unspecified complications ([address] [D56.497] 2020    SBO (small bowel obstruction) ([address] [M10.764] 2020       Admission Condition:  fair     Discharged Condition: stable    Hospital Stay:     Hospital Course:  Moises Sandersno is a 23 y.o. female who was admitted for the management of   Crohn's disease, unspecified, with unspecified complications ([address] , presented to ER with Abdominal Pain (Has Chrohn's disease. Last time here she had an obstruction. Started around noon today.) and Dizziness  Patient with known history of Crohn's disease diagnosed in 2019 presented with diffuse abdominal pain without any diarrhea, blood in stool, fever.  Evaluation showed active Crohn's disease interspersed with chronic partial small bowel obstruction and left upper quadrant and distal ileum.  Patient was recently started on Entyvio about a week ago. Patient was treated with IV steroids, bowel rest.    Patient had bowel movement with improvement in abdominal pain. Diet was started slowly and advanced. Significant therapeutic interventions:   1. Acute Crohn's colitis -treated with IV steroids.   Patient had bowel movement. Pain control. Discharged on oral prednisone with slow taper. Pepcid for GI prophylaxis. Follow-up with Dr. Nain Darnell. Significant Diagnostic Studies:   Labs / Micro:/Radiology  Recent Labs     08/14/20  0546 08/13/20 1955   WBC 7.4 12.0*   HGB 12.5 14.7   HCT 37.5 43.8   MCV 94.0 91.7    412     Labs Renal Latest Ref Rng & Units 8/14/2020 8/13/2020 5/30/2020   BUN 6 - 20 mg/dL 8 11 14   Cr 0.50 - 0.90 mg/dL 0.75 0.80 0.60   K 3.7 - 5.3 mmol/L 4.0 3.9 4.5   Na 135 - 144 mmol/L 139 136 140     Lab Results   Component Value Date    ALT 16 08/14/2020    AST 17 08/14/2020    ALKPHOS 30 (L) 08/14/2020    BILITOT 0.57 08/14/2020     No results found for: TSHFT4, TSH  No results found for: HAV, HEPAIGM, HEPBIGM, HEPBCAB, HBEAG, HEPCAB  Lab Results   Component Value Date    COLORU YELLOW 08/13/2020    NITRU NEGATIVE 08/13/2020    GLUCOSEU NEGATIVE 08/13/2020    KETUA NEGATIVE 08/13/2020    UROBILINOGEN Normal 08/13/2020    BILIRUBINUR NEGATIVE 08/13/2020     No results found for: LABA1C  No results found for: EAG  No results found for: INR, PROTIME    Ct Abdomen Pelvis W Iv Contrast    Result Date: 8/13/2020  1. Active Crohn disease interspersed with areas of chronic partial small bowel obstruction predominating left upper quadrant and distal ileum right lower quadrant. 2. Trace pelvic ascites is probably reactive. 3. Other portions of the CT abdomen/pelvis appear unremarkable. Consultations:    Consults:     Final Specialist Recommendations/Findings:   IP CONSULT TO GI      The patient was seen and examined on day of discharge and this discharge summary is in conjunction with any daily progress note from day of discharge.     Discharge plan:     Disposition: Home    Physician Follow Up:     Izabel Lanza MD  [address] [place] [address] Drive, [address]    In 1 week      Cody Moreno MD  [facility] [address]  [address]  [phone]    In 1 week         Requiring Further Evaluation/Follow Up POST HOSPITALIZATION/Incidental Findings: Diet as tolerated. Medication as advised follow-up with GI as outpatient    Diet: As tolerated. Activity: As tolerated. Instructions to Patient: Medication is advised. Discharge Medications:      Medication List      START taking these medications    famotidine 20 MG tablet  Commonly known as:  PEPCID  Take 1 tablet by mouth 2 times daily     Na Sulfate-K Sulfate-Mg Sulf 17.5-3.13-1.6 GM/177ML Soln  Use as directed in your patient instructions. predniSONE 10 MG tablet  Commonly known as:  DELTASONE  Take 2 tablets by mouth 2 times daily for 7 days, THEN 3.5 tablets daily for 7 days, THEN 3 tablets daily for 7 days, THEN 2.5 tablets daily for 7 days, THEN 2 tablets daily for 7 days, THEN 1.5 tablets daily for 7 days, THEN 1 tablet daily for 7 days. Start taking on:  August 15, 2020        CHANGE how you take these medications    hydrocortisone 2.5 % cream  Apply topically 2 times daily. What changed:    · how to take this  · when to take this  · reasons to take this     pimecrolimus 1 % cream  Commonly known as:  ELIDEL  Apply topically 2 times daily. What changed:    · how to take this  · when to take this  · reasons to take this        CONTINUE taking these medications    azaTHIOprine 50 MG tablet  Commonly known as:  IMURAN     Bentyl 10 MG capsule  Generic drug:  dicyclomine     buPROPion 150 MG extended release tablet  Commonly known as:  WELLBUTRIN XL     cetirizine 10 MG tablet  Commonly known as:  ZYRTEC  Take 1 tablet by mouth daily     Doxycycline Hyclate 50 MG Tabs  Take 1 tablet daily     Jolessa 0.15-0.03 MG per tablet  Generic drug:  levonorgestrel-ethinyl estradiol     lamoTRIgine 200 MG tablet  Commonly known as:  LAMICTAL     metroNIDAZOLE 0.75 % cream  Commonly known as:  MetroCream  Apply topically 2 times daily.      MULTIVITAMIN PO     PROBIOTIC-10 PO     SODIUM FLUORIDE (DENTAL GEL) 1.1 % Crea     Sulfacetamide Sod-Sulfur Wash 9-4.5 % Kit  Wash face twice daily - can dispense any covered sulfacetamide wash     vedolizumab 300 MG injection  Commonly known as:  ENTYVIO     vitamin D 50 MCG (2000 UT) Caps capsule           Where to Get Your Medications      These medications were sent to [facility], [address] [facility] [address] [facility]  [address] [place] [address], [address] [place] [address]    Phone:  [phone]   · famotidine 20 MG tablet  · Na Sulfate-K Sulfate-Mg Sulf 17.5-3.13-1.6 GM/177ML Soln     You can get these medications from any pharmacy    Bring a paper prescription for each of these medications  · predniSONE 10 MG tablet         Time Spent on discharge is  33 mins in patient examination, evaluation, counseling as well as medication reconciliation, prescriptions for required medications, discharge plan and follow up. Electronically signed by   Galdino Vizcarra MD  8/15/2020        Thank you Dr. Latonya Nelson MD for the opportunity to be involved in this patient's care.

## 2020-08-15 NOTE — PROGRESS NOTES
Spoke with Dr. Aida Rene (G.I.) via telephone and okay with discharge. Received order to call in prescription for prednisone.

## 2020-08-15 NOTE — PROGRESS NOTES
Discharge teaching and instructions completed with patient using teachback method. AVS reviewed. Patient voiced understanding regarding prescriptions, follow up appointments, and care of self at home. Discharged home. All questions answered.

## 2020-08-17 ENCOUNTER — PATIENT MESSAGE (OUTPATIENT)
Dept: FAMILY MEDICINE CLINIC | Age: 25
End: 2020-08-17

## 2020-08-17 ENCOUNTER — TELEPHONE (OUTPATIENT)
Dept: GASTROENTEROLOGY | Age: 25
End: 2020-08-17

## 2020-08-17 ENCOUNTER — PATIENT MESSAGE (OUTPATIENT)
Dept: GASTROENTEROLOGY | Age: 25
End: 2020-08-17

## 2020-08-17 ENCOUNTER — HOSPITAL ENCOUNTER (OUTPATIENT)
Dept: MRI IMAGING | Age: 25
Discharge: HOME | End: 2020-08-19
Payer: COMMERCIAL

## 2020-08-17 PROCEDURE — 6360000004 HC RX CONTRAST MEDICATION: Performed by: INTERNAL MEDICINE

## 2020-08-17 PROCEDURE — 2709999900 HC NON-CHARGEABLE SUPPLY

## 2020-08-17 PROCEDURE — 72197 MRI PELVIS W/O & W/DYE: CPT

## 2020-08-17 PROCEDURE — A9576 INJ PROHANCE MULTIPACK: HCPCS | Performed by: INTERNAL MEDICINE

## 2020-08-17 PROCEDURE — 2500000003 HC RX 250 WO HCPCS: Performed by: RADIOLOGY

## 2020-08-17 RX ORDER — SODIUM CHLORIDE 0.9 % (FLUSH) 0.9 %
10 SYRINGE (ML) INJECTION 2 TIMES DAILY
Status: DISCONTINUED | OUTPATIENT
Start: 2020-08-17 | End: 2020-08-20 | Stop reason: HOSPADM

## 2020-08-17 RX ADMIN — GADOTERIDOL 12 ML: 279.3 INJECTION, SOLUTION INTRAVENOUS at 11:15

## 2020-08-17 RX ADMIN — GLUCAGON HYDROCHLORIDE 0.19 MG: KIT at 10:40

## 2020-08-17 RX ADMIN — GLUCAGON HYDROCHLORIDE 0.19 MG: KIT at 10:55

## 2020-08-17 NOTE — TELEPHONE ENCOUNTER
Patient called the office to schedule a hospital follow up for one week. Offered 8/25/20 @ 11:30 am and this was declined. Patient was given next available of 8/31/20 8:45 am at the [facility] office. Writer thanked and call ended.

## 2020-08-18 RX ORDER — TRAMADOL HYDROCHLORIDE 100 MG/1
100 TABLET, FILM COATED, EXTENDED RELEASE ORAL DAILY PRN
Qty: 1 TABLET | Refills: 2 | Status: SHIPPED | OUTPATIENT
Start: 2020-08-18 | End: 2021-01-04 | Stop reason: SDUPTHER

## 2020-08-19 ENCOUNTER — TELEPHONE (OUTPATIENT)
Dept: GASTROENTEROLOGY | Age: 25
End: 2020-08-19

## 2020-08-19 NOTE — TELEPHONE ENCOUNTER
Writer spoke to pt over the phone informing pt she has been sched for covid testing at [facility] 10/12/20 @ 7am.  Pt instructed where to report. Pt voices her understanding.

## 2020-08-21 NOTE — TELEPHONE ENCOUNTER
Patient called stating that the Tramadol is requiring a PA. She said that her pharmacy informed her that they sent over the request a couple days ago. I do not see anything documented. Patient said that she is heading to her entyvio infusion today and was hoping to be able to  the medication tomorrow morning. Patient will reach out to her pharmacy and ask them to fax over the request, but I did explain to her that the PA may not be that simple to get it approved; all medications are different. Patient verbalized she understood.

## 2020-08-21 NOTE — TELEPHONE ENCOUNTER
Script needs PA. Dr Kemp Pry gives vernal order to change script to Tramadol 50 mg, 1 tab Q 6 hrs PRN the day after Entyvio infusion for headache. Qty:3 Refill: 2    Patient is notified.

## 2020-08-27 ENCOUNTER — TELEPHONE (OUTPATIENT)
Dept: GASTROENTEROLOGY | Age: 25
End: 2020-08-27

## 2020-08-31 ENCOUNTER — OFFICE VISIT (OUTPATIENT)
Dept: GASTROENTEROLOGY | Age: 25
End: 2020-08-31
Payer: COMMERCIAL

## 2020-08-31 VITALS — TEMPERATURE: 98.1 F | BODY MASS INDEX: 24.75 KG/M2 | WEIGHT: 135.3 LBS

## 2020-08-31 PROCEDURE — 99214 OFFICE O/P EST MOD 30 MIN: CPT | Performed by: INTERNAL MEDICINE

## 2020-08-31 ASSESSMENT — ENCOUNTER SYMPTOMS
DIARRHEA: 1
ABDOMINAL DISTENTION: 0
ABDOMINAL PAIN: 1
ANAL BLEEDING: 0
SINUS PRESSURE: 0
RECTAL PAIN: 0
TROUBLE SWALLOWING: 0
SINUS PAIN: 0
NAUSEA: 1
SORE THROAT: 0
VOMITING: 0
CHOKING: 0
COUGH: 1
WHEEZING: 0
CONSTIPATION: 0
BLOOD IN STOOL: 0

## 2020-08-31 NOTE — PROGRESS NOTES
GI CLINIC FOLLOW UP    INTERVAL HISTORY:   Jim Catherine MD  [address] [place] [address], [id number] [facility]    Chief Complaint   Patient presents with    Follow-up     Patient is here today for a f/u from hospital           HISTORY OF PRESENT ILLNESS: Clementine Baeza is a 23 y.o. female , referred for evaluation of*CD, SBO . CD with recurrent SBO    was in the hospital one more time   on prednisone taper dose again down to 30 mg /day now    also on Entyvio had two dose and still on Imuran   Also  started on Pepcid   No other issue    doing better   BMs anywhere between 1-5   No bleeding    some cramps occasional   Colonoscopy schedule already for 10/16/2020    Past Medical,Family, and Social History reviewed and does contribute to the patient presentingcondition. Patient's PMH/PSH,SH,PSYCH Hx, MEDs, ALLERGIES, and ROS were all reviewed and updated in the appropriate sections. PAST MEDICAL HISTORY:  Past Medical History:   Diagnosis Date    Anxiety     Crohn's disease of ileum ([facility] [address]     Depression     Seasonal allergies        Past Surgical History:   Procedure Laterality Date    COLONOSCOPY  09/16/2019       CURRENT MEDICATIONS:    Current Outpatient Medications:     traMADol (ULTRAM ER) 100 MG TB24 extended release tablet, Take 1 tablet by mouth daily as needed for Pain for up to 180 days. The day after Entyvio infusion. , Disp: 1 tablet, Rfl: 2    predniSONE (DELTASONE) 10 MG tablet, Take 2 tablets by mouth 2 times daily for 7 days, THEN 3.5 tablets daily for 7 days, THEN 3 tablets daily for 7 days, THEN 2.5 tablets daily for 7 days, THEN 2 tablets daily for 7 days, THEN 1.5 tablets daily for 7 days, THEN 1 tablet daily for 7 days. , Disp: 10 tablet, Rfl: 0    famotidine (PEPCID) 20 MG tablet, Take 1 tablet by mouth 2 times daily, Disp: 60 tablet, Rfl: 3    SODIUM FLUORIDE, DENTAL GEL, 1.1 % CREA, Place onto teeth, Disp: , Rfl:     levonorgestrel-ethinyl estradiol (Emelyn Arriola) 0.15-0.03 MG per tablet, Take 1 tablet by mouth daily, Disp: , Rfl:     vedolizumab (ENTYVIO) 300 MG injection, Infuse 300 mg intravenously once a week Patient is taking this every 8 weeks, Disp: , Rfl:     Doxycycline Hyclate 50 MG TABS, Take 1 tablet daily, Disp: 90 tablet, Rfl: 0    metroNIDAZOLE (METROCREAM) 0.75 % cream, Apply topically 2 times daily. , Disp: 45 g, Rfl: 2    Sulfacetamide-Sulfur-Cleanser (SULFACETAMIDE SOD-SULFUR [address]) 9-4.5 % KIT, Wash face twice daily - can dispense any covered sulfacetamide wash, Disp: 1 kit, Rfl: 3    dicyclomine (BENTYL) 10 MG capsule, 1-2 capsules prn, Disp: , Rfl:     Probiotic Product (PROBIOTIC-10 PO), Take 1 capsule by mouth daily , Disp: , Rfl:     cetirizine (ZYRTEC) 10 MG tablet, Take 1 tablet by mouth daily, Disp: 30 tablet, Rfl: 3    azaTHIOprine (IMURAN) 50 MG tablet, Take 50 mg by mouth daily , Disp: , Rfl:     lamoTRIgine (LAMICTAL) 200 MG tablet, Take 200 mg by mouth daily , Disp: , Rfl:     Cholecalciferol (VITAMIN D) 2000 units CAPS capsule, Take 1 capsule by mouth daily , Disp: , Rfl:     Multiple Vitamins-Minerals (MULTIVITAMIN PO), Take 1 tablet by mouth daily , Disp: , Rfl:     Na Sulfate-K Sulfate-Mg Sulf 17.5-3.13-1.6 GM/177ML SOLN, Use as directed in your patient instructions. (Patient not taking: Reported on 8/31/2020), Disp: 1 Bottle, Rfl: 0    buPROPion (WELLBUTRIN XL) 150 MG extended release tablet, Take 150 mg by mouth every morning, Disp: , Rfl:     pimecrolimus (ELIDEL) 1 % cream, Apply topically 2 times daily. (Patient not taking: Reported on 8/31/2020), Disp: 100 g, Rfl: 1    hydrocortisone 2.5 % cream, Apply topically 2 times daily. (Patient not taking: Reported on 8/31/2020), Disp: 20 Tube, Rfl: 3    ALLERGIES:   Allergies   Allergen Reactions    Nsaids      NO NSAIDs b/c of Crohns.         FAMILY HISTORY:       Problem Relation Age of Onset    Bipolar Disorder Mother     Other Mother     Alcohol Abuse Mother  Alcohol Abuse Maternal Aunt     Bipolar Disorder Maternal Grandfather     Diabetes Maternal Grandfather     Alcohol Abuse Maternal Grandfather     High Blood Pressure Maternal Grandfather     Stroke Maternal Grandfather     Miscarriages / Stillbirths Neg Hx     Coronary Art Dis Neg Hx     Colon Cancer Neg Hx     Uterine Cancer Neg Hx     Ovarian Cancer Neg Hx     Breast Cancer Neg Hx          SOCIAL HISTORY:   Social History     Socioeconomic History    Marital status:      Spouse name: Not on file    Number of children: Not on file    Years of education: Not on file    Highest education level: Not on file   Occupational History    Not on file   Social Needs    Financial resource strain: Not on file    Food insecurity     Worry: Not on file     Inability: Not on file   [employer] needs     Medical: Not on file     Non-medical: Not on file   Tobacco Use    Smoking status: Never Smoker    Smokeless tobacco: Never Used   Substance and Sexual Activity    Alcohol use: No     Alcohol/week: 0.0 standard drinks    Drug use: No    Sexual activity: Not Currently     Partners: Male     Birth control/protection: Condom   Lifestyle    Physical activity     Days per week: Not on file     Minutes per session: Not on file    Stress: Not on file   Relationships    Social connections     Talks on phone: Not on file     Gets together: Not on file     Attends Buddhism service: Not on file     Active member of club or organization: Not on file     Attends meetings of clubs or organizations: Not on file     Relationship status: Not on file    Intimate partner violence     Fear of current or ex partner: Not on file     Emotionally abused: Not on file     Physically abused: Not on file     Forced sexual activity: Not on file   Other Topics Concern    Not on file   Social History Narrative    Not on file       REVIEW OF SYSTEMS: A 12-point review of systemswas obtained and pertinent positives and negatives were enumerated above in the history of present illness. All other reviewed systems / symptoms were negative. Review of Systems   Constitutional: Negative for appetite change, fatigue and unexpected weight change. HENT: Positive for postnasal drip. Negative for sinus pressure, sinus pain, sore throat and trouble swallowing. Respiratory: Positive for cough (morning). Negative for choking and wheezing. Cardiovascular: Positive for chest pain. Negative for palpitations and leg swelling. Gastrointestinal: Positive for abdominal pain (better), diarrhea and nausea (at night). Negative for abdominal distention, anal bleeding, blood in stool, constipation, rectal pain and vomiting. Genitourinary: Negative for difficulty urinating. Allergic/Immunologic: Negative for environmental allergies and food allergies. Neurological: Positive for dizziness and numbness (left arm- sharp pains and numbness at times. ). Negative for weakness, light-headedness and headaches. Hematological: Does not bruise/bleed easily. Psychiatric/Behavioral: Positive for sleep disturbance. The patient is nervous/anxious.             LABORATORY DATA: Reviewed  Lab Results   Component Value Date    WBC 7.4 08/14/2020    HGB 12.5 08/14/2020    HCT 37.5 08/14/2020    MCV 94.0 08/14/2020     08/14/2020     08/14/2020    K 4.0 08/14/2020     08/14/2020    CO2 23 08/14/2020    BUN 8 08/14/2020    CREATININE 0.75 08/14/2020    LABALBU 3.4 (L) 08/14/2020    BILITOT 0.57 08/14/2020    ALKPHOS 30 (L) 08/14/2020    AST 17 08/14/2020    ALT 16 08/14/2020         Lab Results   Component Value Date    RBC 3.99 08/14/2020    HGB 12.5 08/14/2020    MCV 94.0 08/14/2020    MCH 31.3 08/14/2020    MCHC 33.3 08/14/2020    RDW 11.8 08/14/2020    MPV 8.4 08/14/2020    BASOPCT 0 08/13/2020    LYMPHSABS 1.80 08/13/2020    MONOSABS 0.50 08/13/2020    NEUTROABS 9.60 (H) 08/13/2020    EOSABS 0.00 08/13/2020    BASOSABS 0.00 08/13/2020         DIAGNOSTIC TESTING:     Ct Abdomen Pelvis W Iv Contrast    Result Date: 8/13/2020  EXAMINATION: CT OF THE ABDOMEN AND PELVIS WITH CONTRAST 8/13/2020 9:50 pm TECHNIQUE: CT of the abdomen and pelvis was performed with the administration of intravenous contrast. Multiplanar reformatted images are provided for review. Dose modulation, iterative reconstruction, and/or weight based adjustment of the mA/kV was utilized to reduce the radiation dose to as low as reasonably achievable. COMPARISON: CT abdomen and pelvis 05/30/2020 HISTORY: ORDERING SYSTEM PROVIDED HISTORY: Pain, Crohn disease. TECHNOLOGIST PROVIDED HISTORY: IV Only Contrast Pain, rule out obstruction Reason for Exam: Abd pain periumbilical, cramping, onset around noon. Hx of obstruction Acuity: Acute Type of Exam: Initial FINDINGS: Lower Chest: Visualized portions of the lungs are clear. Cardiac and posterior mediastinal structures visualized are unremarkable. Organs: Normal attenuation throughout the liver. No discrete hepatic lesion or intrahepatic bile duct dilatation is seen. The gallbladder, kidneys, spleen, adrenal glands and pancreas appear unremarkable. GI/Bowel: Multifocal skip lesions of small bowel wall thickening with interspersed areas of chronically dilated small bowel loops, most dilated within the pelvis measuring as great as 5.1 cm. Most prominent thickening and inflammatory change is seen in the left upper quadrant. Unremarkable appearance of the stomach and colon. Pelvis: The uterus and adnexal structures appear unremarkable. Urinary bladder is partially filled, unremarkable appearance. No adenopathy. Trace pelvic ascites. Peritoneum/Retroperitoneum: Unremarkable appearance of the aorta. No aneurysm. Unremarkable appearance of the IVC. No adenopathy or fluid. Bones/Soft Tissues: No acute superficial soft tissue or osseous structure abnormality evident.      1. Active Crohn disease interspersed with areas of chronic partial small bowel obstruction predominating left upper quadrant and distal ileum right lower quadrant. 2. Trace pelvic ascites is probably reactive. 3. Other portions of the CT abdomen/pelvis appear unremarkable. Mri Enterography    Result Date: 8/17/2020  EXAMINATION: MRI OF THE ENTEROGRAPHY WITHOUT AND WITH CONTRAST, 8/17/2020 10:27 am TECHNIQUE: Multiplanar multisequence MRI of the abdomen and pelvis  was performed without and with the administration of intravenous contrast utilizing the MR enterography protocol. Examination was performed after the administration of 0.38 mg glucagon. COMPARISON: 08/13/2020 CT HISTORY: ORDERING SYSTEM PROVIDED HISTORY: Crohn's disease of small intestine with intestinal obstruction [facility]) TECHNOLOGIST PROVIDED HISTORY: Is the patient pregnant?->No Reason for Exam: crohns disease of small intestine with intestinal obstrustruction FINDINGS: Improved pattern of partial small bowel obstruction in comparison to recent prior CT. Decreased caliber of small bowel dilation. Several air-fluid levels do remain. There is a short segment of mucosal thickening and enhancement in the distal ileum, sparing the terminal ileum. This represents an area active inflammation with a questionable mild stricture best appreciated on series 15, image 5. Several additional skip lesions are seen more proximally in the small bowel including in the mid jejunum of the right upper quadrant best appreciated on series 14, image 27. additional foci of mild bowel involvement are also observed in the right abdomen on series 14, image 22 and series 14, image 27. There is no evidence of abscess or fistula. Normal mucosal pattern throughout the colon. Stool is seen throughout with no evidence of obstruction. There are no other significant findings. 1. History of Crohn disease with resolution of partial small-bowel obstruction from prior CT.  2. Several areas of active inflammation are described above in the jejunum and ileum. Possible mild stricture in the distal ileum. PHYSICAL EXAMINATION: Vital signs reviewed per the nursing documentation. Temp 98.1 °F (36.7 °C)   Wt 135 lb 4.8 oz (61.4 kg)   BMI 24.75 kg/m²   Body mass index is 24.75 kg/m². Physical Exam  Vitals signs and nursing note reviewed. Constitutional:       General: She is not in acute distress. Appearance: She is well-developed. She is not diaphoretic. HENT:      Head: Normocephalic. Mouth/Throat:      Pharynx: No oropharyngeal exudate. Eyes:      General: No scleral icterus. Pupils: Pupils are equal, round, and reactive to light. Neck:      Musculoskeletal: Normal range of motion and neck supple. Thyroid: No thyromegaly. Vascular: No JVD. Trachea: No tracheal deviation. Cardiovascular:      Rate and Rhythm: Normal rate and regular rhythm. Heart sounds: Normal heart sounds. No murmur. Pulmonary:      Effort: Pulmonary effort is normal. No respiratory distress. Breath sounds: Normal breath sounds. No wheezing. Abdominal:      General: Bowel sounds are normal. There is no distension. Palpations: Abdomen is soft. Tenderness: There is no abdominal tenderness. There is no guarding or rebound. Comments: No ascites   Musculoskeletal: Normal range of motion. Skin:     General: Skin is warm. Coloration: Skin is not pale. Findings: No erythema or rash. Comments: She is not diaphoretic   Neurological:      Mental Status: She is alert and oriented to person, place, and time. Deep Tendon Reflexes: Reflexes are normal and symmetric. Psychiatric:         Behavior: Behavior normal.         Thought Content: Thought content normal.         Judgment: Judgment normal.           IMPRESSION: Ms. Nuvia Harris is a 23 y.o. female with      Diagnosis Orders   1.  Crohn's disease of colon with intestinal obstruction ([address]  Comp Metabolic w Bili Profile    CBC Auto

## 2020-10-02 ENCOUNTER — PATIENT MESSAGE (OUTPATIENT)
Dept: FAMILY MEDICINE CLINIC | Age: 25
End: 2020-10-02

## 2020-10-03 NOTE — TELEPHONE ENCOUNTER
From: Sabiha Blue  To: Inés Elizondo MD  Sent: 10/2/2020 10:17 PM EDT  Subject: Non-Urgent Medical Question    Hi, I had the first couple rounds of the pneumococcal and HPV vaccines last year (9/25 and 10/24), and I was due for the 3rd round in March, but because of COVID and everything, didn't get them done. I still need that extra round, I assume, but I was wondering if I just need the third one, or since it's been so long, if I need to start the course over again, and need more than just 1 more shot each for the pneumococcal and HPV. Thanks!

## 2020-10-12 ENCOUNTER — HOSPITAL ENCOUNTER (OUTPATIENT)
Dept: PREADMISSION TESTING | Age: 25
Setting detail: SPECIMEN
Discharge: HOME | End: 2020-10-16
Payer: COMMERCIAL

## 2020-10-12 PROCEDURE — U0003 INFECTIOUS AGENT DETECTION BY NUCLEIC ACID (DNA OR RNA); SEVERE ACUTE RESPIRATORY SYNDROME CORONAVIRUS 2 (SARS-COV-2) (CORONAVIRUS DISEASE [COVID-19]), AMPLIFIED PROBE TECHNIQUE, MAKING USE OF HIGH THROUGHPUT TECHNOLOGIES AS DESCRIBED BY CMS-2020-01-R: HCPCS

## 2020-10-13 LAB — SARS-COV-2, NAA: NOT DETECTED

## 2020-10-16 ENCOUNTER — ANESTHESIA EVENT (OUTPATIENT)
Dept: OPERATING ROOM | Age: 25
End: 2020-10-16
Payer: COMMERCIAL

## 2020-10-16 ENCOUNTER — HOSPITAL ENCOUNTER (OUTPATIENT)
Age: 25
Setting detail: OUTPATIENT SURGERY
Discharge: HOME | End: 2020-10-16
Attending: INTERNAL MEDICINE | Admitting: INTERNAL MEDICINE
Payer: COMMERCIAL

## 2020-10-16 ENCOUNTER — ANESTHESIA (OUTPATIENT)
Dept: OPERATING ROOM | Age: 25
End: 2020-10-16
Payer: COMMERCIAL

## 2020-10-16 VITALS
BODY MASS INDEX: 25.67 KG/M2 | WEIGHT: 139.5 LBS | TEMPERATURE: 97.2 F | OXYGEN SATURATION: 100 % | HEIGHT: 62 IN | RESPIRATION RATE: 20 BRPM | SYSTOLIC BLOOD PRESSURE: 124 MMHG | DIASTOLIC BLOOD PRESSURE: 85 MMHG | HEART RATE: 82 BPM

## 2020-10-16 VITALS — DIASTOLIC BLOOD PRESSURE: 67 MMHG | SYSTOLIC BLOOD PRESSURE: 127 MMHG | OXYGEN SATURATION: 98 %

## 2020-10-16 LAB — HCG, PREGNANCY URINE (POC): NEGATIVE

## 2020-10-16 PROCEDURE — 2500000003 HC RX 250 WO HCPCS: Performed by: NURSE ANESTHETIST, CERTIFIED REGISTERED

## 2020-10-16 PROCEDURE — 3700000001 HC ADD 15 MINUTES (ANESTHESIA): Performed by: INTERNAL MEDICINE

## 2020-10-16 PROCEDURE — 7100000010 HC PHASE II RECOVERY - FIRST 15 MIN: Performed by: INTERNAL MEDICINE

## 2020-10-16 PROCEDURE — 45380 COLONOSCOPY AND BIOPSY: CPT | Performed by: INTERNAL MEDICINE

## 2020-10-16 PROCEDURE — 81025 URINE PREGNANCY TEST: CPT

## 2020-10-16 PROCEDURE — 3700000000 HC ANESTHESIA ATTENDED CARE: Performed by: INTERNAL MEDICINE

## 2020-10-16 PROCEDURE — 2580000003 HC RX 258: Performed by: ANESTHESIOLOGY

## 2020-10-16 PROCEDURE — 43239 EGD BIOPSY SINGLE/MULTIPLE: CPT | Performed by: INTERNAL MEDICINE

## 2020-10-16 PROCEDURE — 2709999900 HC NON-CHARGEABLE SUPPLY: Performed by: INTERNAL MEDICINE

## 2020-10-16 PROCEDURE — 88305 TISSUE EXAM BY PATHOLOGIST: CPT

## 2020-10-16 PROCEDURE — 7100000011 HC PHASE II RECOVERY - ADDTL 15 MIN: Performed by: INTERNAL MEDICINE

## 2020-10-16 PROCEDURE — 3609012400 HC EGD TRANSORAL BIOPSY SINGLE/MULTIPLE: Performed by: INTERNAL MEDICINE

## 2020-10-16 PROCEDURE — 6360000002 HC RX W HCPCS: Performed by: NURSE ANESTHETIST, CERTIFIED REGISTERED

## 2020-10-16 PROCEDURE — 3609010300 HC COLONOSCOPY W/BIOPSY SINGLE/MULTIPLE: Performed by: INTERNAL MEDICINE

## 2020-10-16 RX ORDER — HYDROMORPHONE HCL 110MG/55ML
0.25 PATIENT CONTROLLED ANALGESIA SYRINGE INTRAVENOUS EVERY 5 MIN PRN
Status: DISCONTINUED | OUTPATIENT
Start: 2020-10-16 | End: 2020-10-16 | Stop reason: HOSPADM

## 2020-10-16 RX ORDER — ONDANSETRON 2 MG/ML
4 INJECTION INTRAMUSCULAR; INTRAVENOUS
Status: DISCONTINUED | OUTPATIENT
Start: 2020-10-16 | End: 2020-10-16 | Stop reason: HOSPADM

## 2020-10-16 RX ORDER — PROPOFOL 10 MG/ML
INJECTION, EMULSION INTRAVENOUS PRN
Status: DISCONTINUED | OUTPATIENT
Start: 2020-10-16 | End: 2020-10-16 | Stop reason: SDUPTHER

## 2020-10-16 RX ORDER — LAMOTRIGINE 150 MG/1
150 TABLET ORAL DAILY
COMMUNITY

## 2020-10-16 RX ORDER — KETAMINE HCL IN NACL, ISO-OSM 100MG/10ML
SYRINGE (ML) INJECTION PRN
Status: DISCONTINUED | OUTPATIENT
Start: 2020-10-16 | End: 2020-10-16 | Stop reason: SDUPTHER

## 2020-10-16 RX ORDER — SODIUM CHLORIDE, SODIUM LACTATE, POTASSIUM CHLORIDE, CALCIUM CHLORIDE 600; 310; 30; 20 MG/100ML; MG/100ML; MG/100ML; MG/100ML
INJECTION, SOLUTION INTRAVENOUS CONTINUOUS
Status: DISCONTINUED | OUTPATIENT
Start: 2020-10-16 | End: 2020-10-16 | Stop reason: HOSPADM

## 2020-10-16 RX ORDER — FENTANYL CITRATE 50 UG/ML
25 INJECTION, SOLUTION INTRAMUSCULAR; INTRAVENOUS EVERY 5 MIN PRN
Status: DISCONTINUED | OUTPATIENT
Start: 2020-10-16 | End: 2020-10-16 | Stop reason: HOSPADM

## 2020-10-16 RX ORDER — LIDOCAINE HYDROCHLORIDE 10 MG/ML
1 INJECTION, SOLUTION EPIDURAL; INFILTRATION; INTRACAUDAL; PERINEURAL
Status: DISCONTINUED | OUTPATIENT
Start: 2020-10-16 | End: 2020-10-16 | Stop reason: HOSPADM

## 2020-10-16 RX ORDER — MIDAZOLAM HYDROCHLORIDE 1 MG/ML
INJECTION INTRAMUSCULAR; INTRAVENOUS PRN
Status: DISCONTINUED | OUTPATIENT
Start: 2020-10-16 | End: 2020-10-16 | Stop reason: SDUPTHER

## 2020-10-16 RX ORDER — LIDOCAINE HYDROCHLORIDE 20 MG/ML
INJECTION, SOLUTION EPIDURAL; INFILTRATION; INTRACAUDAL; PERINEURAL PRN
Status: DISCONTINUED | OUTPATIENT
Start: 2020-10-16 | End: 2020-10-16 | Stop reason: SDUPTHER

## 2020-10-16 RX ADMIN — SODIUM CHLORIDE, POTASSIUM CHLORIDE, SODIUM LACTATE AND CALCIUM CHLORIDE: 600; 310; 30; 20 INJECTION, SOLUTION INTRAVENOUS at 10:30

## 2020-10-16 RX ADMIN — PROPOFOL 20 MG: 10 INJECTION, EMULSION INTRAVENOUS at 11:29

## 2020-10-16 RX ADMIN — PROPOFOL 20 MG: 10 INJECTION, EMULSION INTRAVENOUS at 11:17

## 2020-10-16 RX ADMIN — PROPOFOL 20 MG: 10 INJECTION, EMULSION INTRAVENOUS at 11:34

## 2020-10-16 RX ADMIN — PROPOFOL 50 MG: 10 INJECTION, EMULSION INTRAVENOUS at 11:14

## 2020-10-16 RX ADMIN — PROPOFOL 20 MG: 10 INJECTION, EMULSION INTRAVENOUS at 11:21

## 2020-10-16 RX ADMIN — PROPOFOL 20 MG: 10 INJECTION, EMULSION INTRAVENOUS at 11:25

## 2020-10-16 RX ADMIN — Medication 25 MG: at 11:26

## 2020-10-16 RX ADMIN — Medication 25 MG: at 11:19

## 2020-10-16 RX ADMIN — PROPOFOL 50 MG: 10 INJECTION, EMULSION INTRAVENOUS at 11:12

## 2020-10-16 RX ADMIN — LIDOCAINE HYDROCHLORIDE 100 MG: 20 INJECTION, SOLUTION EPIDURAL; INFILTRATION; INTRACAUDAL; PERINEURAL at 11:12

## 2020-10-16 RX ADMIN — MIDAZOLAM 2 MG: 1 INJECTION INTRAMUSCULAR; INTRAVENOUS at 11:16

## 2020-10-16 ASSESSMENT — PULMONARY FUNCTION TESTS
PIF_VALUE: 1

## 2020-10-16 ASSESSMENT — PAIN - FUNCTIONAL ASSESSMENT: PAIN_FUNCTIONAL_ASSESSMENT: 0-10

## 2020-10-16 NOTE — OP NOTE
Operative Note    PROCEDURE NOTE    DATE OF PROCEDURE: 10/16/2020    SURGEON: Donnie Puente MD  Facility : [facility] [address]  ASSISTANT: None  Anesthesia: MAC  PREOPERATIVE DIAGNOSIS:   Crohn's disease    POSTOPERATIVE DIAGNOSIS: as described below    OPERATION: Total colonoscopy     ANESTHESIA: Moderate Sedation    ESTIMATED BLOOD LOSS: less than 50     COMPLICATIONS: None. SPECIMENS:  Was Obtained:     Terminal ileum has polypoidal protrusion with normal-looking mucosa, more than one would expect from lymphoid aggregate for which I took biopsies        HISTORY: The patient is a 23y.o. year old female with history of above preop diagnosis. I recommended colonoscopy with possible biopsy or polypectomy and I explained the risk, benefits, expected outcome, and alternatives to the procedure. Risks included but are not limited to bleeding, infection, respiratory distress, hypotension, and perforation of the colon and possibility of missing a lesion. The patient understands and is in agreement. The patient was counseled at length about the risks of enrique Covid-19 during their perioperative period and any recovery window from their procedure. The patient was made aware that enrique Covid-19  may worsen their prognosis for recovering from their procedure  and lend to a higher morbidity and/or mortality risk. All material risks, benefits, and reasonable alternatives including postponing the procedure were discussed. The patient does wish to proceed with the procedure at this time. PROCEDURE: The patient was given IV conscious sedation. The patient's SPO2 remained above 90% throughout the procedure. The colonoscope was inserted per rectum and advanced under direct vision to the cecum without difficulty. Post sedation note : The patient's SPO2 remained above 90% throughout the procedure. the vital signs remained stable , and no immediate complication form the procedure noted, patient will be ready for d/c when criteria is met . The prep was good. Findings:  Terminal ileum: abnormal: Terminal ileum has polypoidal protrusion with normal-looking mucosa, more than one would expect from lymphoid aggregate for which I took biopsies      Cecum/Ascending colon: normal    Transverse colon: normal    Descending/Sigmoid colon: abnormal: Few spots which were mildly erythematous indicating probably healed colitis    Rectum/Anus: examined in normal and retroflexed positions and was normal    Withdrawal Time was (minutes): 10    The colon was decompressed and the scope was removed. The patient tolerated the procedure well. Recommendations/Plan:   1. Lifestyle and dietary modifications as discussed  2. F/U Biopsies  3. F/U In OfficeYes  4.  Discussed with the family      Electronically signed by Bob Wiley MD  on 10/16/2020 at 11:40 AM

## 2020-10-16 NOTE — ANESTHESIA POSTPROCEDURE EVALUATION
Department of Anesthesiology  Postprocedure Note    Patient: Donal Nicole  MRN: [id number]  YOB: 1995  Date of evaluation: 10/16/2020  Time:  3:20 PM     Procedure Summary     Date:  10/16/20 Room / Location:  [place] [address] / [facility] - INPATIENT    Anesthesia Start:  1111 Anesthesia Stop:  1139    Procedures:       COLONOSCOPY WITH BIOPSY (N/A )      EGD BIOPSY (N/A ) Diagnosis:  (DX CROHNS)    Surgeon:  Joselito Chang MD Responsible Provider:  Alycia King MD    Anesthesia Type:  TIVA ASA Status:  2          Anesthesia Type: TIVA    Jc Phase I:      Cj Phase II: Cj Score: 10    Last vitals: Reviewed and per EMR flowsheets.        Anesthesia Post Evaluation    Patient location during evaluation: PACU  Patient participation: complete - patient participated  Level of consciousness: awake  Airway patency: patent  Nausea & Vomiting: no nausea  Complications: no  Cardiovascular status: blood pressure returned to baseline  Respiratory status: acceptable  Hydration status: euvolemic

## 2020-10-16 NOTE — OP NOTE
Operative Note    PROCEDURE NOTE    DATE OF PROCEDURE: 10/16/2020     SURGEON: Uriah Johnson MD  Facility: [facility] [address]  ASSISTANT: None  Anesthesia: MAc   PREOPERATIVE DIAGNOSIS:   Dyspepsia   SBO    Diagnosis:  Gastritis , bxs taken           POSTOPERATIVE DIAGNOSIS: As described below    OPERATION: Upper GI endoscopy with Biopsy    ANESTHESIA: Moderate Sedation     ESTIMATED BLOOD LOSS: Less than 50 ml    COMPLICATIONS: None. SPECIMENS:  Was Obtained:     Gastritis , bxs taken     HISTORY: The patient is a 23y.o. year old female with history of above preop diagnosis. I recommended esophagogastroduodenoscopy with possible biopsy and I explained the risk, benefits, expected outcome, and alternatives to the procedure. Risks included but are not limited to bleeding, infection, respiratory distress, hypotension, and perforation of the esophagus, stomach, or duodenum. Patient understands and is in agreement. The patient was counseled at length about the risks of enrique Covid-19 during their perioperative period and any recovery window from their procedure. The patient was made aware that enrique Covid-19  may worsen their prognosis for recovering from their procedure  and lend to a higher morbidity and/or mortality risk. All material risks, benefits, and reasonable alternatives including postponing the procedure were discussed. The patient does wish to proceed with the procedure at this time. PROCEDURE: The patient was given IV conscious sedation. The patient's SPO2 remained above 90% throughout the procedure. The gastroscope was inserted orally and advanced under direct vision through the esophagus, through the stomach, through the pylorus, and into the descending duodenum. Post sedation note : The patient's SPO2 remained above 90% throughout the procedure. the vital signs remained stable , and no immediate complication form the procedure noted, patient will be ready for d/c when criteria is met . Findings:    Retropharyngeal area was grossly normal appearing    Esophagus: normal    Stomach:    Fundus: normal    Body: abnormal: Gastritis , bxs taken     Antrum: abnormal: Gastritis , bxs taken     Duodenum:     Descending: normal    Bulb: normal    The scope was removed and the patient tolerated the procedure well. Recommendations/Plan:   1. F/U Biopsies  2. F/U In Office in 3-4 weeks  3.  Discussed with the family    Electronically signed by Dre Rendon MD  on 10/16/2020 at 11:21 AM

## 2020-10-16 NOTE — H&P
History and Physical Service   [facility] [place] - INPATIENT    HISTORY AND PHYSICAL EXAMINATION            Date of Evaluation: 10/16/2020  Patient name:  Jackie Jain  MRN:   [id number]  YOB: 1995  PCP:    Juan Luis Miner MD    History Obtained From:     Patient, Medical records    History of Present Illness: This is Jackie Jain a 23 y.o. female who presents today for a diagnostic colonoscopy, EGD by Dr. Sebastián Hsu for crohn's disease. The patient completed the bowel prep as directed and now has watery yellow stool. No family history of colon cancer. Previous colonoscopy was 09/17/2019. This is the pt's first EGD. Pt has a history of recurring bowel obstructions which cause severe LLQ abdominal pain. Pt states her last bowel obstruction was in 08/2020. The pt has alternating diarrhea and constipation which have improved with Entyvio. Pt states she has occasional bloating and acid reflux. Pt denies black tarry stools, nausea, vomiting, fever, chills, night sweats, and unexplained weight loss. Pt denies history of ulcers, hiatal hernia, IBS, and diabetes. Vitamin D and multiple vitamins-minerals tablet were last taken on 10/15/2020. Past Medical History:     Past Medical History:   Diagnosis Date    Anxiety     Crohn's disease of ileum ([address]     Depression     Seasonal allergies         Past Surgical History:     Past Surgical History:   Procedure Laterality Date    COLONOSCOPY  09/16/2019    WISDOM TOOTH EXTRACTION          Medications Prior to Admission:     Prior to Admission medications    Medication Sig Start Date End Date Taking? Authorizing Provider   lamoTRIgine (LAMICTAL) 150 MG tablet Take 150 mg by mouth daily   Yes Historical Provider, MD   traMADol Rosea Sat [facility]) 100 MG TB24 extended release tablet Take 1 tablet by mouth daily as needed for Pain for up to 180 days. The day after Entyvio infusion.  8/18/20 2/14/21 Yes Jerri Bartholomew MD   famotidine (PEPCID) 20 MG tablet Take 1 tablet by mouth 2 times daily 8/15/20  Yes Rosalio Edouard MD   dicyclomine (BENTYL) 10 MG capsule 1-2 capsules prn 7/30/19  Yes Historical Provider, MD   Probiotic Product (PROBIOTIC-10 PO) Take 1 capsule by mouth daily    Yes Historical Provider, MD   azaTHIOprine (IMURAN) 50 MG tablet Take 50 mg by mouth daily  9/17/19  Yes Historical Provider, MD   Cholecalciferol (VITAMIN D) 2000 units CAPS capsule Take 1 capsule by mouth daily    Yes Historical Provider, MD   Multiple Vitamins-Minerals (MULTIVITAMIN PO) Take 1 tablet by mouth daily    Yes Historical Provider, MD   Na Sulfate-K Sulfate-Mg Sulf 17.5-3.13-1.6 GM/177ML SOLN Use as directed in your patient instructions. Patient not taking: Reported on 8/31/2020 8/14/20   Jerri Bartholomew MD   SODIUM FLUORIDE, DENTAL GEL, 1.1 % CREA Place onto teeth    Historical Provider, MD   levonorgestrel-ethinyl estradiol (Eugene Groom) 0.15-0.03 MG per tablet Take 1 tablet by mouth daily    Historical Provider, MD   buPROPion (WELLBUTRIN XL) 150 MG extended release tablet Take 150 mg by mouth every morning    Historical Provider, MD   vedolizumab (ENTYVIO) 300 MG injection Infuse 300 mg intravenously Patient is taking this every 2 months. Historical Provider, MD   Doxycycline Hyclate 50 MG TABS Take 1 tablet daily 8/5/20   Aura Alfaro MD   Sulfacetamide-Sulfur-Cleanser (SULFACETAMIDE SOD-SULFUR [address] [facility]) 9-4.5 % KIT Wash face twice daily - can dispense any covered sulfacetamide wash 8/5/20   Aura Alfaro MD   cetirizine (ZYRTEC) 10 MG tablet Take 1 tablet by mouth daily 3/3/20   Saad Ruiz MD   lamoTRIgine (LAMICTAL) 200 MG tablet Take 200 mg by mouth daily  2/26/19   Historical Provider, MD   pimecrolimus (ELIDEL) 1 % cream Apply topically 2 times daily. Patient not taking: Reported on 8/31/2020 12/30/17   Saad Ruiz MD   hydrocortisone 2.5 % cream Apply topically 2 times daily.   Patient not taking: Reported on 8/31/2020 3/14/17   Saad Ruiz MD

## 2020-10-19 ENCOUNTER — TELEPHONE (OUTPATIENT)
Dept: GASTROENTEROLOGY | Age: 25
End: 2020-10-19

## 2020-10-19 LAB — SURGICAL PATHOLOGY REPORT: NORMAL

## 2020-10-19 NOTE — TELEPHONE ENCOUNTER
Patient called the office to schedule colon follow up and advised that she has one for 12/7/20 and I would place on the cancellation list.  Writer thanked and call ended.

## 2020-11-03 ENCOUNTER — TELEPHONE (OUTPATIENT)
Dept: GASTROENTEROLOGY | Age: 25
End: 2020-11-03

## 2020-11-03 NOTE — TELEPHONE ENCOUNTER
11/3/20 Spoke to the patient and confirmed appointment for 11/4/20, Leslie, ins, id, copay, mask, no more than 5 minutes early, and due to rise in covid-19 alone to the appointment. -hiren

## 2020-11-04 ENCOUNTER — OFFICE VISIT (OUTPATIENT)
Dept: GASTROENTEROLOGY | Age: 25
End: 2020-11-04
Payer: COMMERCIAL

## 2020-11-04 PROCEDURE — 99214 OFFICE O/P EST MOD 30 MIN: CPT | Performed by: INTERNAL MEDICINE

## 2020-11-04 RX ORDER — POLYETHYLENE GLYCOL 3350 17 G/17G
17 POWDER, FOR SOLUTION ORAL DAILY
COMMUNITY

## 2020-11-04 NOTE — PROGRESS NOTES
GI CLINIC FOLLOW UP    INTERVAL HISTORY:   No referring provider defined for this encounter. Chief Complaint   Patient presents with    Crohn's Disease     Patient is f/u on Crohn's, EGD, and colonoscopy           HISTORY OF PRESENT ILLNESS: Stephen Flores is a 23 y.o. female , referred for evaluation of*Crohn's disease  This young lady is here for follow-up  Her colonoscopy did not show severe activity of the disease please refer to the result  Her MRA showed scattered ulcers and some stricturing  She did have small bowel obstruction symptoms in the past but at this time she is completely asymptomatic she had no nausea no vomiting no abdominal pain. She finished her prednisone taper dose and she is doing very well she said she has no diarrhea her stool is forming she had 1 or 2 bowel movements a day she does not have any black stool blood in the stool she does not have any nausea vomiting fever chills or weight loss she is eating and drinking well and has no issues at this point. She still on Imuran at a 50 mg dose daily  And she is getting the interview  No side effects from that she can report      DATE OF PROCEDURE: 10/16/2020      SURGEON: Ashley Ware MD  Facility: [facility]  ASSISTANT: None  Anesthesia: MAc   PREOPERATIVE DIAGNOSIS:   Dyspepsia   SBO     Diagnosis:  Gastritis , bxs taken         POSTOPERATIVE DIAGNOSIS: As described below     OPERATION: Upper GI endoscopy with Biopsy     ANESTHESIA: Moderate Sedation      ESTIMATED BLOOD LOSS: Less than 50 ml     COMPLICATIONS: None.      SPECIMENS:  Was Obtained:      Gastritis , bxs taken      HISTORY: The patient is a 23y.o. year old female with history of above preop diagnosis. I recommended esophagogastroduodenoscopy with possible biopsy and I explained the risk, benefits, expected outcome, and alternatives to the procedure.   Risks included but are not limited to bleeding, infection, respiratory distress, hypotension, and perforation of the esophagus, stomach, or duodenum. Patient understands and is in agreement.      The patient was counseled at length about the risks of enrique Covid-19 during their perioperative period and any recovery window from their procedure.  The patient was made aware that enrique Covid-19  may worsen their prognosis for recovering from their procedure  and lend to a higher morbidity and/or mortality risk.  All material risks, benefits, and reasonable alternatives including postponing the procedure were discussed. The patient does wish to proceed with the procedure at this time.      PROCEDURE: The patient was given IV conscious sedation. The patient's SPO2 remained above 90% throughout the procedure. The gastroscope was inserted orally and advanced under direct vision through the esophagus, through the stomach, through the pylorus, and into the descending duodenum.       Post sedation note : The patient's SPO2 remained above 90% throughout the procedure. the vital signs remained stable , and no immediate complication form the procedure noted, patient will be ready for d/c when criteria is met .      Findings:     Retropharyngeal area was grossly normal appearing     Esophagus: normal     Stomach:    Fundus: normal    Body: abnormal: Gastritis , bxs taken     Antrum: abnormal: Gastritis , bxs taken      Duodenum:     Descending: normal    Bulb: normal     The scope was removed and the patient tolerated the procedure well.      Recommendations/Plan:   1. F/U Biopsies  2. F/U In Office in 3-4 weeks  3.  Discussed with the family     Electronically signed by Rai Lopez MD  on 10/16/2020 at 11:21 AM   DATE OF PROCEDURE: 10/16/2020     SURGEON: Rai Lopez MD  Facility : [facility]  ASSISTANT: None  Anesthesia: MAC  PREOPERATIVE DIAGNOSIS:   Crohn's disease     POSTOPERATIVE DIAGNOSIS: as described below     OPERATION: Total colonoscopy      ANESTHESIA: Moderate Sedation     ESTIMATED BLOOD LOSS: less than 50      COMPLICATIONS: None.      SPECIMENS:  Was Obtained:      Terminal ileum has polypoidal protrusion with normal-looking mucosa, more than one would expect from lymphoid aggregate for which I took biopsies      HISTORY: The patient is a 23y.o. year old female with history of above preop diagnosis. I recommended colonoscopy with possible biopsy or polypectomy and I explained the risk, benefits, expected outcome, and alternatives to the procedure. Risks included but are not limited to bleeding, infection, respiratory distress, hypotension, and perforation of the colon and possibility of missing a lesion. The patient understands and is in agreement.       The patient was counseled at length about the risks of enrique Covid-19 during their perioperative period and any recovery window from their procedure.  The patient was made aware that enrique Covid-19  may worsen their prognosis for recovering from their procedure  and lend to a higher morbidity and/or mortality risk.  All material risks, benefits, and reasonable alternatives including postponing the procedure were discussed. The patient does wish to proceed with the procedure at this time.      PROCEDURE: The patient was given IV conscious sedation. The patient's SPO2 remained above 90% throughout the procedure.      The colonoscope was inserted per rectum and advanced under direct vision to the cecum without difficulty.       Post sedation note : The patient's SPO2 remained above 90% throughout the procedure. the vital signs remained stable , and no immediate complication form the procedure noted, patient will be ready for d/c when criteria is met .      The prep was good.       Findings:  Terminal ileum: abnormal: Terminal ileum has polypoidal protrusion with normal-looking mucosa, more than one would expect from lymphoid aggregate for which I took biopsies      Cecum/Ascending colon: normal     Transverse colon: normal     Descending/Sigmoid colon: abnormal: Few spots which were mildly erythematous indicating probably healed colitis     Rectum/Anus: examined in normal and retroflexed positions and was normal     Withdrawal Time was (minutes): 10     The colon was decompressed and the scope was removed. The patient tolerated the procedure well.      Recommendations/Plan:   4. Lifestyle and dietary modifications as discussed  5. F/U Biopsies  6. F/U In OfficeYes  7. Discussed with the family        Electronically signed by Juanis Rosado MD  on 10/16/2020 at 11:40 AM   -- Diagnosis --   1.  STOMACH BIOPSIES:  MILD CHRONIC INACTIVE GASTRITIS. 2.  TERMINAL ILEUM BIOPSIES:  NORMAL SMALL INTESTINAL MUCOSA. LINDA Sequeira   **Electronically Signed Out**         ajb/10/19/2020       Past Medical,Family, and Social History reviewed and does contribute to the patient presentingcondition. Patient's PMH/PSH,SH,PSYCH Hx, MEDs, ALLERGIES, and ROS were all reviewed and updated in the appropriate sections. PAST MEDICAL HISTORY:  Past Medical History:   Diagnosis Date    Anxiety     [address]'s disease of ileum [address] [address]     Depression     Seasonal allergies        Past Surgical History:   Procedure Laterality Date    COLONOSCOPY  09/16/2019    COLONOSCOPY N/A 10/16/2020    COLONOSCOPY WITH BIOPSY performed by Juanis Rosado MD at [facility] N/A 10/16/2020    EGD BIOPSY performed by Juanis Rosado MD at [address]:    Current Outpatient Medications:     metroNIDAZOLE (METROCREAM) 0.75 % cream, Apply topically 2 times daily as needed Apply topically 2 times daily. , Disp: , Rfl:     polyethylene glycol (GLYCOLAX) 17 g packet, Take 17 g by mouth daily, Disp: , Rfl:     lamoTRIgine (LAMICTAL) 150 MG tablet, Take 150 mg by mouth daily, Disp: , Rfl:     traMADol (ULTRAM ER) 100 MG TB24 extended release tablet, Take 1 tablet by mouth daily as needed for Pain for up to 180 days. The day after Entyvio infusion. , Disp: 1 tablet, Rfl: 2    famotidine (PEPCID) 20 MG tablet, Take 1 tablet by mouth 2 times daily, Disp: 60 tablet, Rfl: 3    SODIUM FLUORIDE, DENTAL GEL, 1.1 % CREA, Place onto teeth, Disp: , Rfl:     levonorgestrel-ethinyl estradiol (JOLESSA) 0.15-0.03 MG per tablet, Take 1 tablet by mouth daily, Disp: , Rfl:     vedolizumab (ENTYVIO) 300 MG injection, Infuse 300 mg intravenously Patient is taking this every 2 months., Disp: , Rfl:     Sulfacetamide-Sulfur-Cleanser (SULFACETAMIDE SOD-SULFUR WASH) 9-4.5 % KIT, Wash face twice daily - can dispense any covered sulfacetamide wash, Disp: 1 kit, Rfl: 3    dicyclomine (BENTYL) 10 MG capsule, 1-2 capsules prn, Disp: , Rfl:     Probiotic Product (PROBIOTIC-10 PO), Take 1 capsule by mouth daily , Disp: , Rfl:     azaTHIOprine (IMURAN) 50 MG tablet, Take 50 mg by mouth daily , Disp: , Rfl:     Cholecalciferol (VITAMIN D) 2000 units CAPS capsule, Take 1 capsule by mouth daily , Disp: , Rfl:     Multiple Vitamins-Minerals (MULTIVITAMIN PO), Take 1 tablet by mouth daily , Disp: , Rfl:     hydrocortisone 2.5 % cream, Apply topically 2 times daily. , Disp: 20 Tube, Rfl: 3    buPROPion (WELLBUTRIN XL) 150 MG extended release tablet, Take 150 mg by mouth every morning, Disp: , Rfl:     Doxycycline Hyclate 50 MG TABS, Take 1 tablet daily, Disp: 90 tablet, Rfl: 0    cetirizine (ZYRTEC) 10 MG tablet, Take 1 tablet by mouth daily, Disp: 30 tablet, Rfl: 3    lamoTRIgine (LAMICTAL) 200 MG tablet, Take 200 mg by mouth daily , Disp: , Rfl:     pimecrolimus (ELIDEL) 1 % cream, Apply topically 2 times daily. (Patient not taking: Reported on 8/31/2020), Disp: 100 g, Rfl: 1    ALLERGIES:   Allergies   Allergen Reactions    Nsaids      NO NSAIDs b/c of Crohns.         FAMILY HISTORY:       Problem Relation Age of Onset    Bipolar Disorder Mother     Other Mother     Alcohol Abuse Mother     Alcohol Abuse Maternal Aunt     Bipolar Disorder Maternal Grandfather     Diabetes Maternal Grandfather     Alcohol Abuse Maternal Grandfather     High Blood Pressure Maternal Grandfather     Stroke Maternal Grandfather     Miscarriages / Stillbirths Neg Hx     Coronary Art Dis Neg Hx     Colon Cancer Neg Hx     Uterine Cancer Neg Hx     Ovarian Cancer Neg Hx     Breast Cancer Neg Hx          SOCIAL HISTORY:   Social History     Socioeconomic History    Marital status:      Spouse name: Not on file    Number of children: Not on file    Years of education: Not on file    Highest education level: Not on file   Occupational History    Not on file   Social Needs    Financial resource strain: Not on file    Food insecurity     Worry: Not on file     Inability: Not on file    Transportation needs     Medical: Not on file     Non-medical: Not on file   Tobacco Use    Smoking status: Never Smoker    Smokeless tobacco: Never Used   Substance and Sexual Activity    Alcohol use:  Yes     Alcohol/week: 0.0 standard drinks     Comment: Social    Drug use: No    Sexual activity: Not Currently     Partners: Male     Birth control/protection: Condom   Lifestyle    Physical activity     Days per week: Not on file     Minutes per session: Not on file    Stress: Not on file   Relationships    Social connections     Talks on phone: Not on file     Gets together: Not on file     Attends Hoahaoism service: Not on file     Active member of club or organization: Not on file     Attends meetings of clubs or organizations: Not on file     Relationship status: Not on file    Intimate partner violence     Fear of current or ex partner: Not on file     Emotionally abused: Not on file     Physically abused: Not on file     Forced sexual activity: Not on file   Other Topics Concern    Not on file   Social History Narrative    Not on file       REVIEW OF SYSTEMS: A 12-point review of systemswas obtained and pertinent positives and negatives were enumerated above in the history of present illness. All other reviewed systems / symptoms were negative. Review of Systems        LABORATORY DATA: Reviewed  Lab Results   Component Value Date    WBC 7.4 08/14/2020    HGB 12.5 08/14/2020    HCT 37.5 08/14/2020    MCV 94.0 08/14/2020     08/14/2020     08/14/2020    K 4.0 08/14/2020     08/14/2020    CO2 23 08/14/2020    BUN 8 08/14/2020    CREATININE 0.75 08/14/2020    LABALBU 3.4 (L) 08/14/2020    BILITOT 0.57 08/14/2020    ALKPHOS 30 (L) 08/14/2020    AST 17 08/14/2020    ALT 16 08/14/2020         Lab Results   Component Value Date    RBC 3.99 08/14/2020    HGB 12.5 08/14/2020    MCV 94.0 08/14/2020    MCH 31.3 08/14/2020    MCHC 33.3 08/14/2020    RDW 11.8 08/14/2020    MPV 8.4 08/14/2020    BASOPCT 0 08/13/2020    LYMPHSABS 1.80 08/13/2020    MONOSABS 0.50 08/13/2020    NEUTROABS 9.60 (H) 08/13/2020    EOSABS 0.00 08/13/2020    BASOSABS 0.00 08/13/2020         DIAGNOSTIC TESTING:     No results found. PHYSICAL EXAMINATION: Vital signs reviewed per the nursing documentation. There were no vitals taken for this visit. There is no height or weight on file to calculate BMI. Physical Exam  Vitals signs and nursing note reviewed. Constitutional:       General: She is not in acute distress. Appearance: She is well-developed. She is not diaphoretic. HENT:      Head: Normocephalic. Mouth/Throat:      Pharynx: No oropharyngeal exudate. Eyes:      General: No scleral icterus. Pupils: Pupils are equal, round, and reactive to light. Neck:      Musculoskeletal: Normal range of motion and neck supple. Thyroid: No thyromegaly. Vascular: No JVD. Trachea: No tracheal deviation. Cardiovascular:      Rate and Rhythm: Normal rate and regular rhythm. Heart sounds: Normal heart sounds. No murmur. Pulmonary:      Effort: Pulmonary effort is normal. No respiratory distress. Breath sounds: Normal breath sounds. No wheezing. Abdominal:      General: Bowel sounds are normal. There is no distension. Palpations: Abdomen is soft. Tenderness: There is no abdominal tenderness. There is no guarding or rebound. Comments: No ascites   Musculoskeletal: Normal range of motion. Skin:     General: Skin is warm. Coloration: Skin is not pale. Findings: No erythema or rash. Comments: She is not diaphoretic   Neurological:      Mental Status: She is alert and oriented to person, place, and time. Deep Tendon Reflexes: Reflexes are normal and symmetric. Psychiatric:         Behavior: Behavior normal.         Thought Content: Thought content normal.         Judgment: Judgment normal.           IMPRESSION: Ms. Va Badillo is a 23 y.o. female with      Diagnosis Orders   1. Crohn's disease of colon with intestinal obstruction ([address]       Will DC the Imuran  She is finished with the prednisone at this time  We will continue with the [facility]  She is to report any exacerbation of the symptoms  We talked about pregnancy if she ever wants to have children in the future  Patient was alerted to look for any signs or symptoms of bowel obstruction as she might have strictures and if that the case then she may need dilatation or surgery down the road. Diet/life style/natural hx /complication of the dx were all explained in details   Past medical, past surgical, social history, psychiatric history, medications or allergies, all reviewed and  updated    Thank you for allowing me to participate in the care of Ms. Miranda. For any further questions please do not hesitate to contact me. I have reviewed and agree with the ROS entered by the MA/RN. Note is dictated utilizing voice recognition software. Unfortunately this leads to occasional typographical errors. Please contact our office if you have any questions.       Thomas Tavarez MD  [facility]  O: #[phone]

## 2020-11-24 RX ORDER — LEVONORGESTREL / ETHINYL ESTRADIOL 0.15-0.03
KIT ORAL
Qty: 91 TABLET | Refills: 0 | Status: SHIPPED | OUTPATIENT
Start: 2020-11-24 | End: 2020-12-07

## 2020-11-24 NOTE — TELEPHONE ENCOUNTER
Tricia Favre is calling to request a refill on the following medication(s):    Last Visit Date (If Applicable):  1/04/0212    Next Visit Date:    Visit date not found    Medication Request:  Requested Prescriptions     Pending Prescriptions Disp Refills   Thedora Fariha 0.15-0.03 MG per tablet [Pharmacy Med Name: Dion Gaesperanza 0.15 MG-0.03 MG TABLET] 91 tablet 0     Sig: TAKE ONE TABLET BY MOUTH DAILY

## 2020-11-25 ENCOUNTER — TELEPHONE (OUTPATIENT)
Dept: GASTROENTEROLOGY | Age: 25
End: 2020-11-25

## 2020-11-25 NOTE — TELEPHONE ENCOUNTER
Patient called noting that since she was placed on Tramadol for her headaches, she notes the tramadol is helping however now she is having stomach pain. Patient called wondering if there was something Dr. Penelope Myles could prescribe her. After consulting with Dr. Penelope Myles he advised to follow up with her PCP about the Tramadol.

## 2020-11-26 ENCOUNTER — HOSPITAL ENCOUNTER (EMERGENCY)
Facility: CLINIC | Age: 25
Discharge: ANOTHER HOSPITAL | End: 2020-11-26
Attending: EMERGENCY MEDICINE
Payer: COMMERCIAL

## 2020-11-26 ENCOUNTER — APPOINTMENT (OUTPATIENT)
Dept: GENERAL RADIOLOGY | Facility: CLINIC | Age: 25
End: 2020-11-26
Payer: COMMERCIAL

## 2020-11-26 ENCOUNTER — HOSPITAL ENCOUNTER (INPATIENT)
Age: 25
LOS: 2 days | Discharge: HOME | DRG: 387 | End: 2020-11-28
Attending: INTERNAL MEDICINE | Admitting: INTERNAL MEDICINE
Payer: COMMERCIAL

## 2020-11-26 ENCOUNTER — APPOINTMENT (OUTPATIENT)
Dept: CT IMAGING | Age: 25
DRG: 387 | End: 2020-11-26
Attending: INTERNAL MEDICINE
Payer: COMMERCIAL

## 2020-11-26 VITALS
HEART RATE: 91 BPM | TEMPERATURE: 97.9 F | SYSTOLIC BLOOD PRESSURE: 124 MMHG | BODY MASS INDEX: 24.69 KG/M2 | OXYGEN SATURATION: 100 % | WEIGHT: 135 LBS | RESPIRATION RATE: 16 BRPM | DIASTOLIC BLOOD PRESSURE: 81 MMHG

## 2020-11-26 PROBLEM — K50.00 CROHN'S DISEASE OF SMALL INTESTINE (HCC): Status: ACTIVE | Noted: 2020-11-26

## 2020-11-26 PROBLEM — K50.819 CROHN'S DISEASE OF SMALL AND LARGE INTESTINES WITH COMPLICATION (HCC): Status: ACTIVE | Noted: 2020-08-13

## 2020-11-26 PROBLEM — K56.609 SMALL BOWEL OBSTRUCTION (HCC): Status: ACTIVE | Noted: 2020-11-26

## 2020-11-26 PROBLEM — K56.600 PARTIAL SMALL BOWEL OBSTRUCTION (HCC): Status: ACTIVE | Noted: 2020-11-26

## 2020-11-26 PROBLEM — K59.00 CONSTIPATION: Status: ACTIVE | Noted: 2020-11-26

## 2020-11-26 LAB
-: ABNORMAL
ABSOLUTE EOS #: 0 K/UL (ref 0–0.4)
ABSOLUTE IMMATURE GRANULOCYTE: ABNORMAL K/UL (ref 0–0.3)
ABSOLUTE LYMPH #: 1.1 K/UL (ref 1–4.8)
ABSOLUTE MONO #: 0.5 K/UL (ref 0.1–1.2)
ALBUMIN SERPL-MCNC: 4.1 G/DL (ref 3.5–5.2)
ALBUMIN/GLOBULIN RATIO: 1.3 (ref 1–2.5)
ALP BLD-CCNC: 38 U/L (ref 35–104)
ALT SERPL-CCNC: 14 U/L (ref 5–33)
AMORPHOUS: ABNORMAL
AMYLASE: 79 U/L (ref 28–100)
ANION GAP SERPL CALCULATED.3IONS-SCNC: 14 MMOL/L (ref 9–17)
AST SERPL-CCNC: 19 U/L
BACTERIA: ABNORMAL
BASOPHILS # BLD: 0 % (ref 0–2)
BASOPHILS ABSOLUTE: 0.1 K/UL (ref 0–0.2)
BILIRUB SERPL-MCNC: 0.4 MG/DL (ref 0.3–1.2)
BILIRUBIN URINE: NEGATIVE
BUN BLDV-MCNC: 12 MG/DL (ref 6–20)
BUN/CREAT BLD: ABNORMAL (ref 9–20)
CALCIUM SERPL-MCNC: 9.7 MG/DL (ref 8.6–10.4)
CASTS UA: ABNORMAL /LPF (ref 0–2)
CHLORIDE BLD-SCNC: 103 MMOL/L (ref 98–107)
CO2: 21 MMOL/L (ref 20–31)
COLOR: YELLOW
COMMENT UA: ABNORMAL
CREAT SERPL-MCNC: 0.6 MG/DL (ref 0.5–0.9)
CRYSTALS, UA: ABNORMAL /HPF
DIFFERENTIAL TYPE: ABNORMAL
EOSINOPHILS RELATIVE PERCENT: 0 % (ref 1–4)
EPITHELIAL CELLS UA: ABNORMAL /HPF (ref 0–5)
GFR AFRICAN AMERICAN: >60 ML/MIN
GFR NON-AFRICAN AMERICAN: >60 ML/MIN
GFR SERPL CREATININE-BSD FRML MDRD: ABNORMAL ML/MIN/{1.73_M2}
GFR SERPL CREATININE-BSD FRML MDRD: ABNORMAL ML/MIN/{1.73_M2}
GLUCOSE BLD-MCNC: 104 MG/DL (ref 70–99)
GLUCOSE URINE: NEGATIVE
HCG(URINE) PREGNANCY TEST: NEGATIVE
HCT VFR BLD CALC: 44 % (ref 36–46)
HEMOGLOBIN: 14.6 G/DL (ref 12–16)
IMMATURE GRANULOCYTES: ABNORMAL %
KETONES, URINE: ABNORMAL
LEUKOCYTE ESTERASE, URINE: ABNORMAL
LIPASE: 29 U/L (ref 13–60)
LYMPHOCYTES # BLD: 8 % (ref 24–44)
MCH RBC QN AUTO: 30.6 PG (ref 26–34)
MCHC RBC AUTO-ENTMCNC: 33.3 G/DL (ref 31–37)
MCV RBC AUTO: 91.9 FL (ref 80–100)
MONOCYTES # BLD: 4 % (ref 2–11)
MUCUS: ABNORMAL
NITRITE, URINE: NEGATIVE
NRBC AUTOMATED: ABNORMAL PER 100 WBC
OTHER OBSERVATIONS UA: ABNORMAL
PDW BLD-RTO: 12.2 % (ref 12.5–15.4)
PH UA: 6.5 (ref 5–8)
PLATELET # BLD: 331 K/UL (ref 140–450)
PLATELET ESTIMATE: ABNORMAL
PMV BLD AUTO: 6.8 FL (ref 6–12)
POTASSIUM SERPL-SCNC: 3.9 MMOL/L (ref 3.7–5.3)
PROTEIN UA: NEGATIVE
RBC # BLD: 4.79 M/UL (ref 4–5.2)
RBC # BLD: ABNORMAL 10*6/UL
RBC UA: ABNORMAL /HPF (ref 0–2)
RENAL EPITHELIAL, UA: ABNORMAL /HPF
SARS-COV-2, RAPID: NOT DETECTED
SARS-COV-2: NORMAL
SARS-COV-2: NORMAL
SEG NEUTROPHILS: 88 % (ref 36–66)
SEGMENTED NEUTROPHILS ABSOLUTE COUNT: 12.7 K/UL (ref 1.8–7.7)
SODIUM BLD-SCNC: 138 MMOL/L (ref 135–144)
SOURCE: NORMAL
SPECIFIC GRAVITY UA: 1.02 (ref 1–1.03)
TOTAL PROTEIN: 7.3 G/DL (ref 6.4–8.3)
TRICHOMONAS: ABNORMAL
TSH SERPL DL<=0.05 MIU/L-ACNC: 0.65 MIU/L (ref 0.3–5)
TURBIDITY: ABNORMAL
URINE HGB: ABNORMAL
UROBILINOGEN, URINE: NORMAL
WBC # BLD: 14.4 K/UL (ref 3.5–11)
WBC # BLD: ABNORMAL 10*3/UL
WBC UA: ABNORMAL /HPF (ref 0–5)
YEAST: ABNORMAL

## 2020-11-26 PROCEDURE — 6360000002 HC RX W HCPCS: Performed by: NURSE PRACTITIONER

## 2020-11-26 PROCEDURE — 74022 RADEX COMPL AQT ABD SERIES: CPT

## 2020-11-26 PROCEDURE — 36415 COLL VENOUS BLD VENIPUNCTURE: CPT

## 2020-11-26 PROCEDURE — APPSS45 APP SPLIT SHARED TIME 31-45 MINUTES: Performed by: NURSE PRACTITIONER

## 2020-11-26 PROCEDURE — G0378 HOSPITAL OBSERVATION PER HR: HCPCS

## 2020-11-26 PROCEDURE — 85025 COMPLETE CBC W/AUTO DIFF WBC: CPT

## 2020-11-26 PROCEDURE — 2060000000 HC ICU INTERMEDIATE R&B

## 2020-11-26 PROCEDURE — 6370000000 HC RX 637 (ALT 250 FOR IP): Performed by: NURSE PRACTITIONER

## 2020-11-26 PROCEDURE — 80053 COMPREHEN METABOLIC PANEL: CPT

## 2020-11-26 PROCEDURE — 96375 TX/PRO/DX INJ NEW DRUG ADDON: CPT

## 2020-11-26 PROCEDURE — G0379 DIRECT REFER HOSPITAL OBSERV: HCPCS

## 2020-11-26 PROCEDURE — 6360000002 HC RX W HCPCS: Performed by: EMERGENCY MEDICINE

## 2020-11-26 PROCEDURE — 2580000003 HC RX 258: Performed by: INTERNAL MEDICINE

## 2020-11-26 PROCEDURE — 2580000003 HC RX 258: Performed by: EMERGENCY MEDICINE

## 2020-11-26 PROCEDURE — 2580000003 HC RX 258: Performed by: NURSE PRACTITIONER

## 2020-11-26 PROCEDURE — 96376 TX/PRO/DX INJ SAME DRUG ADON: CPT

## 2020-11-26 PROCEDURE — 99222 1ST HOSP IP/OBS MODERATE 55: CPT | Performed by: INTERNAL MEDICINE

## 2020-11-26 PROCEDURE — 74177 CT ABD & PELVIS W/CONTRAST: CPT

## 2020-11-26 PROCEDURE — 99285 EMERGENCY DEPT VISIT HI MDM: CPT

## 2020-11-26 PROCEDURE — 81025 URINE PREGNANCY TEST: CPT

## 2020-11-26 PROCEDURE — 83690 ASSAY OF LIPASE: CPT

## 2020-11-26 PROCEDURE — 96361 HYDRATE IV INFUSION ADD-ON: CPT

## 2020-11-26 PROCEDURE — 96374 THER/PROPH/DIAG INJ IV PUSH: CPT

## 2020-11-26 PROCEDURE — 87086 URINE CULTURE/COLONY COUNT: CPT

## 2020-11-26 PROCEDURE — U0002 COVID-19 LAB TEST NON-CDC: HCPCS

## 2020-11-26 PROCEDURE — 82150 ASSAY OF AMYLASE: CPT

## 2020-11-26 PROCEDURE — 81001 URINALYSIS AUTO W/SCOPE: CPT

## 2020-11-26 PROCEDURE — 6360000004 HC RX CONTRAST MEDICATION: Performed by: INTERNAL MEDICINE

## 2020-11-26 PROCEDURE — 96365 THER/PROPH/DIAG IV INF INIT: CPT

## 2020-11-26 PROCEDURE — 84443 ASSAY THYROID STIM HORMONE: CPT

## 2020-11-26 RX ORDER — PROMETHAZINE HYDROCHLORIDE 25 MG/ML
12.5 INJECTION, SOLUTION INTRAMUSCULAR; INTRAVENOUS ONCE
Status: COMPLETED | OUTPATIENT
Start: 2020-11-26 | End: 2020-11-26

## 2020-11-26 RX ORDER — SODIUM CHLORIDE 0.9 % (FLUSH) 0.9 %
10 SYRINGE (ML) INJECTION PRN
Status: DISCONTINUED | OUTPATIENT
Start: 2020-11-26 | End: 2020-11-28 | Stop reason: HOSPADM

## 2020-11-26 RX ORDER — METHYLPREDNISOLONE SODIUM SUCCINATE 40 MG/ML
40 INJECTION, POWDER, LYOPHILIZED, FOR SOLUTION INTRAMUSCULAR; INTRAVENOUS EVERY 12 HOURS
Status: DISCONTINUED | OUTPATIENT
Start: 2020-11-26 | End: 2020-11-28

## 2020-11-26 RX ORDER — DIPHENHYDRAMINE HYDROCHLORIDE 50 MG/ML
12.5 INJECTION INTRAMUSCULAR; INTRAVENOUS EVERY 6 HOURS PRN
Status: DISCONTINUED | OUTPATIENT
Start: 2020-11-26 | End: 2020-11-28 | Stop reason: HOSPADM

## 2020-11-26 RX ORDER — SODIUM CHLORIDE 9 MG/ML
INJECTION, SOLUTION INTRAVENOUS CONTINUOUS
Status: DISCONTINUED | OUTPATIENT
Start: 2020-11-26 | End: 2020-11-28

## 2020-11-26 RX ORDER — PROMETHAZINE HYDROCHLORIDE 25 MG/1
12.5 TABLET ORAL EVERY 6 HOURS PRN
Status: DISCONTINUED | OUTPATIENT
Start: 2020-11-26 | End: 2020-11-28 | Stop reason: HOSPADM

## 2020-11-26 RX ORDER — NICOTINE 21 MG/24HR
1 PATCH, TRANSDERMAL 24 HOURS TRANSDERMAL DAILY PRN
Status: DISCONTINUED | OUTPATIENT
Start: 2020-11-26 | End: 2020-11-28 | Stop reason: HOSPADM

## 2020-11-26 RX ORDER — ONDANSETRON 2 MG/ML
4 INJECTION INTRAMUSCULAR; INTRAVENOUS EVERY 6 HOURS PRN
Status: DISCONTINUED | OUTPATIENT
Start: 2020-11-26 | End: 2020-11-28 | Stop reason: HOSPADM

## 2020-11-26 RX ORDER — LEVONORGESTREL AND ETHINYL ESTRADIOL 0.15-0.03
1 KIT ORAL DAILY
Status: DISCONTINUED | OUTPATIENT
Start: 2020-11-26 | End: 2020-11-28 | Stop reason: HOSPADM

## 2020-11-26 RX ORDER — 0.9 % SODIUM CHLORIDE 0.9 %
80 INTRAVENOUS SOLUTION INTRAVENOUS ONCE
Status: COMPLETED | OUTPATIENT
Start: 2020-11-26 | End: 2020-11-26

## 2020-11-26 RX ORDER — DIAPER,BRIEF,INFANT-TODD,DISP
EACH MISCELLANEOUS 2 TIMES DAILY
Status: DISCONTINUED | OUTPATIENT
Start: 2020-11-26 | End: 2020-11-28 | Stop reason: HOSPADM

## 2020-11-26 RX ORDER — SODIUM CHLORIDE 0.9 % (FLUSH) 0.9 %
10 SYRINGE (ML) INJECTION EVERY 12 HOURS SCHEDULED
Status: DISCONTINUED | OUTPATIENT
Start: 2020-11-26 | End: 2020-11-28 | Stop reason: HOSPADM

## 2020-11-26 RX ORDER — POLYETHYLENE GLYCOL 3350 17 G/17G
17 POWDER, FOR SOLUTION ORAL DAILY PRN
Status: DISCONTINUED | OUTPATIENT
Start: 2020-11-26 | End: 2020-11-28 | Stop reason: HOSPADM

## 2020-11-26 RX ORDER — MORPHINE SULFATE 4 MG/ML
4 INJECTION, SOLUTION INTRAMUSCULAR; INTRAVENOUS ONCE
Status: COMPLETED | OUTPATIENT
Start: 2020-11-26 | End: 2020-11-26

## 2020-11-26 RX ORDER — SODIUM CHLORIDE 9 MG/ML
1000 INJECTION, SOLUTION INTRAVENOUS CONTINUOUS
Status: DISCONTINUED | OUTPATIENT
Start: 2020-11-26 | End: 2020-11-26 | Stop reason: HOSPADM

## 2020-11-26 RX ORDER — ACETAMINOPHEN 325 MG/1
650 TABLET ORAL EVERY 6 HOURS PRN
Status: DISCONTINUED | OUTPATIENT
Start: 2020-11-26 | End: 2020-11-28 | Stop reason: HOSPADM

## 2020-11-26 RX ORDER — POTASSIUM CHLORIDE 7.45 MG/ML
10 INJECTION INTRAVENOUS PRN
Status: DISCONTINUED | OUTPATIENT
Start: 2020-11-26 | End: 2020-11-28 | Stop reason: HOSPADM

## 2020-11-26 RX ORDER — ACETAMINOPHEN 650 MG/1
650 SUPPOSITORY RECTAL EVERY 6 HOURS PRN
Status: DISCONTINUED | OUTPATIENT
Start: 2020-11-26 | End: 2020-11-28 | Stop reason: HOSPADM

## 2020-11-26 RX ORDER — MAGNESIUM SULFATE 1 G/100ML
1 INJECTION INTRAVENOUS PRN
Status: DISCONTINUED | OUTPATIENT
Start: 2020-11-26 | End: 2020-11-28 | Stop reason: HOSPADM

## 2020-11-26 RX ORDER — 0.9 % SODIUM CHLORIDE 0.9 %
1000 INTRAVENOUS SOLUTION INTRAVENOUS ONCE
Status: COMPLETED | OUTPATIENT
Start: 2020-11-26 | End: 2020-11-26

## 2020-11-26 RX ORDER — CIPROFLOXACIN 2 MG/ML
400 INJECTION, SOLUTION INTRAVENOUS ONCE
Status: COMPLETED | OUTPATIENT
Start: 2020-11-26 | End: 2020-11-26

## 2020-11-26 RX ORDER — POTASSIUM CHLORIDE 20 MEQ/1
40 TABLET, EXTENDED RELEASE ORAL PRN
Status: DISCONTINUED | OUTPATIENT
Start: 2020-11-26 | End: 2020-11-28 | Stop reason: HOSPADM

## 2020-11-26 RX ADMIN — ACETAMINOPHEN 650 MG: 325 TABLET ORAL at 22:25

## 2020-11-26 RX ADMIN — PROMETHAZINE HYDROCHLORIDE 12.5 MG: 25 INJECTION INTRAMUSCULAR; INTRAVENOUS at 03:46

## 2020-11-26 RX ADMIN — LEVONORGESTREL AND ETHINYL ESTRADIOL 1 TABLET: KIT at 10:14

## 2020-11-26 RX ADMIN — SODIUM CHLORIDE 1000 ML: 9 INJECTION, SOLUTION INTRAVENOUS at 02:11

## 2020-11-26 RX ADMIN — SODIUM CHLORIDE: 9 INJECTION, SOLUTION INTRAVENOUS at 10:02

## 2020-11-26 RX ADMIN — HYDROMORPHONE HYDROCHLORIDE 0.5 MG: 1 INJECTION, SOLUTION INTRAMUSCULAR; INTRAVENOUS; SUBCUTANEOUS at 11:51

## 2020-11-26 RX ADMIN — METHYLPREDNISOLONE SODIUM SUCCINATE 40 MG: 40 INJECTION, POWDER, FOR SOLUTION INTRAMUSCULAR; INTRAVENOUS at 16:13

## 2020-11-26 RX ADMIN — IOPAMIDOL 75 ML: 755 INJECTION, SOLUTION INTRAVENOUS at 09:22

## 2020-11-26 RX ADMIN — Medication 10 ML: at 09:22

## 2020-11-26 RX ADMIN — MORPHINE SULFATE 4 MG: 4 INJECTION, SOLUTION INTRAMUSCULAR; INTRAVENOUS at 02:11

## 2020-11-26 RX ADMIN — LAMOTRIGINE 150 MG: 25 TABLET ORAL at 10:13

## 2020-11-26 RX ADMIN — SODIUM CHLORIDE: 9 INJECTION, SOLUTION INTRAVENOUS at 19:47

## 2020-11-26 RX ADMIN — Medication 10 ML: at 10:06

## 2020-11-26 RX ADMIN — PROMETHAZINE HYDROCHLORIDE 12.5 MG: 25 INJECTION INTRAMUSCULAR; INTRAVENOUS at 02:11

## 2020-11-26 RX ADMIN — MORPHINE SULFATE 4 MG: 4 INJECTION, SOLUTION INTRAMUSCULAR; INTRAVENOUS at 05:23

## 2020-11-26 RX ADMIN — HYDROMORPHONE HYDROCHLORIDE 0.5 MG: 1 INJECTION, SOLUTION INTRAMUSCULAR; INTRAVENOUS; SUBCUTANEOUS at 15:37

## 2020-11-26 RX ADMIN — MORPHINE SULFATE 4 MG: 4 INJECTION, SOLUTION INTRAMUSCULAR; INTRAVENOUS at 03:45

## 2020-11-26 RX ADMIN — CIPROFLOXACIN 400 MG: 2 INJECTION, SOLUTION INTRAVENOUS at 05:23

## 2020-11-26 RX ADMIN — SODIUM CHLORIDE 80 ML: 9 INJECTION, SOLUTION INTRAVENOUS at 09:21

## 2020-11-26 RX ADMIN — SODIUM CHLORIDE 1000 ML: 9 INJECTION, SOLUTION INTRAVENOUS at 05:22

## 2020-11-26 SDOH — HEALTH STABILITY: MENTAL HEALTH: HOW MANY STANDARD DRINKS CONTAINING ALCOHOL DO YOU HAVE ON A TYPICAL DAY?: 1 OR 2

## 2020-11-26 SDOH — HEALTH STABILITY: MENTAL HEALTH: HOW OFTEN DO YOU HAVE A DRINK CONTAINING ALCOHOL?: 2-4 TIMES A MONTH

## 2020-11-26 ASSESSMENT — PAIN SCALES - GENERAL
PAINLEVEL_OUTOF10: 7
PAINLEVEL_OUTOF10: 9
PAINLEVEL_OUTOF10: 8
PAINLEVEL_OUTOF10: 0
PAINLEVEL_OUTOF10: 3
PAINLEVEL_OUTOF10: 0
PAINLEVEL_OUTOF10: 7
PAINLEVEL_OUTOF10: 9

## 2020-11-26 ASSESSMENT — ENCOUNTER SYMPTOMS
COUGH: 0
SHORTNESS OF BREATH: 0
RESPIRATORY NEGATIVE: 1
EYE PAIN: 0
NAUSEA: 1
EYE DISCHARGE: 0
EYE REDNESS: 0
ABDOMINAL PAIN: 1
VOMITING: 0
CONSTIPATION: 0
DIARRHEA: 0
ABDOMINAL PAIN: 1
NAUSEA: 1
DIARRHEA: 1
SORE THROAT: 0
VOMITING: 1
WHEEZING: 0
STRIDOR: 0
COLOR CHANGE: 0
ROS SKIN COMMENTS: ITCHING
EYES NEGATIVE: 1

## 2020-11-26 ASSESSMENT — PAIN DESCRIPTION - DESCRIPTORS
DESCRIPTORS: HEADACHE
DESCRIPTORS: ACHING

## 2020-11-26 ASSESSMENT — PAIN DESCRIPTION - PROGRESSION
CLINICAL_PROGRESSION: NOT CHANGED
CLINICAL_PROGRESSION: NOT CHANGED

## 2020-11-26 ASSESSMENT — PAIN DESCRIPTION - FREQUENCY
FREQUENCY: INTERMITTENT
FREQUENCY: CONTINUOUS

## 2020-11-26 ASSESSMENT — PAIN - FUNCTIONAL ASSESSMENT
PAIN_FUNCTIONAL_ASSESSMENT: ACTIVITIES ARE NOT PREVENTED
PAIN_FUNCTIONAL_ASSESSMENT: ACTIVITIES ARE NOT PREVENTED

## 2020-11-26 ASSESSMENT — PAIN DESCRIPTION - LOCATION
LOCATION: HEAD
LOCATION: ABDOMEN

## 2020-11-26 ASSESSMENT — PAIN DESCRIPTION - PAIN TYPE
TYPE: ACUTE PAIN
TYPE: ACUTE PAIN

## 2020-11-26 ASSESSMENT — PAIN DESCRIPTION - ONSET
ONSET: GRADUAL
ONSET: ON-GOING

## 2020-11-26 NOTE — PROGRESS NOTES
Physical Therapy  DATE: 2020    NAME: Lawanda Philip  MRN: [id number]   : 1995    Patient not seen this date for Physical Therapy due to:  [] Blood transfusion in progress  [] Hemodialysis  [] Patient Declined  [] Spine Precautions   [] Strict Bedrest  [] Surgery/ Procedure  [] Testing      [] Other        [x] PT is being discontinued at this time. Patient independent. No further needs. Pt demonstrates the ability to ambulate 100ft independently without device- complains of abdominal pain/discomfort but otherwise feels normal with gait. Pt denies any mobility concerns or concerns about mobility related to her return home. Will defer PT evaluation at this time secondary to pt's independence. Please re-order therapy with change in medical status or mobility as necessary. [] PT is being discontinued at this time due to declining physical/ medical status. Therapy is not appropriate at this time.     Devora López, PT

## 2020-11-26 NOTE — ED NOTES
Pt resting quietly during medication administration, respirations even and unlabored.  Wakes to voice, no apparent distress noted      Amie Momin RN  [id number]

## 2020-11-26 NOTE — CONSULTS
HxCC:  24 y/o female admitted for abdominal pain and concern for small bowel obstruction. Patient diagnosed with Crohn's disease a year ago. Patient is being followed by Dr. Ildefonso Sanchez. Patient developed generalized abdominal pain 3 days ago and has gotten progressively worse. Complains of nausea. Last bowel movement was yesterday. Denies melena or hematochezia. No history of abdominal surgeries. Denies fevers, chills, or sweats.       Vitals:    11/26/20 0618   BP: 135/84   Pulse: 92   Resp: 18   Temp: 99 °F (37.2 °C)   SpO2: 97%       Patient Active Problem List   Diagnosis    Contraceptive education    Eczema    Crohn's disease without complication (HCC)    Exacerbation of Crohn's disease, unspecified complication (HCC)    [facility]    Crohn's disease, unspecified, with unspecified complications ([facility]    Recurrent major depressive disorder ([facility]    Generalized abdominal pain    Crohn's disease of colon with intestinal obstruction ([facility]    Small bowel obstruction (HCC)       Current Facility-Administered Medications   Medication Dose Route Frequency Provider Last Rate Last Dose    sodium chloride flush 0.9 % injection 10 mL  10 mL Intravenous 2 times per day Juvenal Guerrero, APRN - CNP        sodium chloride flush 0.9 % injection 10 mL  10 mL Intravenous PRN Juvenal Guerrero, APRN - CNP        potassium chloride (KLOR-CON M) extended release tablet 40 mEq  40 mEq Oral PRN Juvenal Guerrero, APRN - CNP        Or    potassium bicarb-citric acid (EFFER-K) effervescent tablet 40 mEq  40 mEq Oral PRN Juvenal Guerrero, APRN - CNP        Or    potassium chloride 10 mEq/100 mL IVPB (Peripheral Line)  10 mEq Intravenous PRN Juvenal Guerrero, APRN - CNP        magnesium sulfate 1 g in dextrose 5% 100 mL IVPB  1 g Intravenous PRN Juvenal Guerrero, APRN - CNP        acetaminophen (TYLENOL) tablet 650 mg  650 mg Oral Q6H PRN Juvenal Guerrero, APRN - CNP        Or    acetaminophen (TYLENOL) suppository 650 mg  650 mg Rectal Q6H PRN Paulette Cronin, APRN - CNP        polyethylene glycol (GLYCOLAX) packet 17 g  17 g Oral Daily PRN Paulette Cronin, APRN - CNP        promethazine (PHENERGAN) tablet 12.5 mg  12.5 mg Oral Q6H PRN Paulette Cronin, SUDHIR - CNP        Or    ondansetron [facility]) injection 4 mg  4 mg Intravenous Q6H PRN Paulette Cronin, SUDHIR - CNP        nicotine (NICODERM CQ) 21 MG/24HR 1 patch  1 patch Transdermal Daily PRN Paulette Cronin, APRN - CNP        enoxaparin (LOVENOX) injection 40 mg  40 mg Subcutaneous Daily SUDHIR Myles - CNP        0.9 % sodium chloride infusion   Intravenous Continuous Paulette Cronin, APRN - CNP           Allergies   Allergen Reactions    Nsaids      NO NSAIDs b/c of Crohns. Past Medical History:   Diagnosis Date    Anxiety     [facility]um [facility]     Depression     Seasonal allergies        Past Surgical History:   Procedure Laterality Date    COLONOSCOPY  09/16/2019    COLONOSCOPY N/A 10/16/2020    COLONOSCOPY WITH BIOPSY performed by Pawel Bagley MD at [facility] N/A 10/16/2020    EGD BIOPSY performed by Pawel Bagley MD at [facility]         Family History   Problem Relation Age of Onset    Bipolar Disorder Mother     Other Mother     Alcohol Abuse Mother     Alcohol Abuse Maternal Aunt     Bipolar Disorder Maternal Grandfather     Diabetes Maternal Grandfather     Alcohol Abuse Maternal Grandfather     High Blood Pressure Maternal Grandfather     Stroke Maternal Grandfather     Miscarriages / Stillbirths Neg Hx     Coronary Art Dis Neg Hx     Colon Cancer Neg Hx     Uterine Cancer Neg Hx     Ovarian Cancer Neg Hx     Breast Cancer Neg Hx        Review of Systems:  14 systems were reviewed. Positives noted above. Remainder are negative per CMS guidelines.       Exam  General:

## 2020-11-26 NOTE — ED NOTES
[facility], nurse supervisor calling to give bed # [phone] for admission.      Larry Parra RN  11/26/20 6202

## 2020-11-26 NOTE — PROGRESS NOTES
Patient arrived from [place] ED. Patient transferred to bed. Patient oriented to room and use of call light. Call light and personal items within reach. Admission and assessment initiated. POC and education initiated and reviewed with patient. Denied further needs or questions at this time.  Patient is still refusing having an NG placed

## 2020-11-26 NOTE — PROGRESS NOTES
Patient arrived to unit at 0615. Admission database started. No orders were in place. Night shift NP notified.  Awaiting response

## 2020-11-26 NOTE — ED NOTES
Pain reassessed. Pt initially states morphine helped ease her pain, then states, \"actually, I am thinking about it know, and the pain is back and very bad. \" Pt appears in no distress as she says this. Respirations are even and unlabored.      Eunice Conroy RN  04/29/00 7204

## 2020-11-26 NOTE — ED NOTES
Pt ambulatory to triage with her significant other. Pt reports hx of Chron's and believes she has been having a flare up triggered by adding more vegetables to her diet. Denies N/V/D. Pt appears well and is acting appropriate, no apparent distress noted.       Josette Valle RN  38/63/76 0151

## 2020-11-26 NOTE — H&P
[facility]  Office: [address], DO, Rosiotwila Bossman, DO, Alysedaniel Johnson DO, Kayceulises Mosqueda DO, Annelise Daily MD, Roxanna Lopes MD, Monserrat Melton MD, Juan Willis MD, Cristhian Arredondo MD, Karl Oseguera MD, Karla Parker MD, Linda Lisa MD, Reyna Harden MD, Madhuri Morris DO, Leo Morgan MD, Siri Dodge MD, Rustam Ovalles DO, Mirian Wadsworth MD,  Isabella Siegel DO, Ty Terrell MD, Courtney Peña MD, Hasmukh Moore, [facility], [facility], CNP, Ortiz Muñoz, CNP, Eliud Leija, CNS, Luis Stanley, CNP, Cindy Mustafa, CNP, Romero Duffy, CNP, Ann-Marie Freire, [facility], Laura Douglas, CNP, Macrina Bernstein PA-C, America Marroquin, [facility], Marsha Castillo, CNP, Slim, CNP, Dayton Pena, CNP, Roverto Hopson, CNP, Jailyn Holly, [facility]   [address] [facility]    HISTORY AND PHYSICAL EXAMINATION            Date:   11/26/2020  Patient name:  Jackie Jain  Date of admission:  11/26/2020  6:10 AM  MRN:   [id number]  Account:  [de-identified]  YOB: 1995  PCP:    Juan Luis Miner MD  Room:   [address] [place] [address]  Code Status:    Full Code    Chief Complaint:     Abdominal pain    History Obtained From:     Patient, EMR    History of Present Illness:     Jackie Jain is a 23 y.o. Non-/non  female who presents with No chief complaint on file. and is admitted to the hospital for the management of SBO [facility] bowel obstruction) ([facility] Patient was diagnosed with Crohn's disease approximately a year ago and follows with Dr. Sebastián Hsu. She says she had a post colonoscopy follow-up visit with Dr. Sebastián Hsu November 4 and was advised to advance her diet. Patient says then she ate beef stirfry with baby corn on Tuesday. By Tuesday evening her abdominal pain started and persisted throughout Wednesday. Wednesday night she had a bout of dizziness to the point where she felt near syncopal with vision fading at times.   She had one bout of small diarrhea. She came to the ED for further evaluation last night when her abdominal pain became severe and she was afraid she had a bowel obstruction as she has had a few in the recent past.    While in ED, x-ray of the abdomen was obtained and revealed multiple loops of moderately distended small bowel with air-fluid levels which could be representative of a small bowel ileus versus early/partial small bowel obstruction. Dr. Rosa Damon from general surgery was called for consult and patient was admitted to the inpatient unit for further management of small bowel obstruction. Was also concern that patient could have UTI, however UA is more representative of contaminated urine and true UTI. She has no urinary symptoms at this time. Dr. Rosa Damon evaluated patient-no surgical intervention planned at this time. Patient was placed n.p.o., started on IV hydration, and IV steroid. She is receiving Dilaudid for pain. CT of the abdomen pelvis with contrast was completed and confirmed a small bowel obstruction to the level of the distal jejunum at the site of inflammatory stricture. There is also separate long segment small bowel inflammation involving the distal and terminal ileum. Past Medical History:     Past Medical History:   Diagnosis Date    Anxiety     Crohn's disease of ileum ([facility]     Depression     Seasonal allergies         Past Surgical History:     Past Surgical History:   Procedure Laterality Date    COLONOSCOPY  09/16/2019    COLONOSCOPY N/A 10/16/2020    COLONOSCOPY WITH BIOPSY performed by Rai Lopez MD at [address] N/A 10/16/2020    EGD BIOPSY performed by Rai Lopez MD at [address] [place] [address]          Medications Prior to Admission:     Prior to Admission medications    Medication Sig Start Date End Date Taking?  Authorizing Provider   Davian Morillo 0.15-0.03 MG per tablet TAKE ONE TABLET BY MOUTH DAILY 11/24/20  Yes Jim Ta Cancer Neg Hx     Ovarian Cancer Neg Hx     Breast Cancer Neg Hx        Review of Systems:     Positive and Negative as described in HPI. Review of Systems   Constitutional: Negative. HENT: Negative. Eyes: Negative. Respiratory: Negative. Cardiovascular: Negative. Gastrointestinal: Positive for abdominal pain, diarrhea, nausea and vomiting. Genitourinary: Negative. Musculoskeletal: Negative. Skin:        Itching   Neurological: Negative. Psychiatric/Behavioral: Positive for sleep disturbance. The patient is not nervous/anxious. Physical Exam:   /69   Pulse 100   Temp 98.6 °F (37 °C) (Oral)   Resp 18   Ht 5' 1\" (1.549 m)   Wt 149 lb 3.2 oz (67.7 kg)   SpO2 98%   BMI 28.19 kg/m²   Temp (24hrs), Av.5 °F (36.9 °C), Min:97.9 °F (36.6 °C), Max:99 °F (37.2 °C)    No results for input(s): POCGLU in the last 72 hours.     Intake/Output Summary (Last 24 hours) at 2020 1729  Last data filed at 2020 1050  Gross per 24 hour   Intake --   Output 550 ml   Net -550 ml       Physical Exam    Investigations:      Laboratory Testing:  Recent Results (from the past 24 hour(s))   CBC Auto Differential    Collection Time: 20  2:27 AM   Result Value Ref Range    WBC 14.4 (H) 3.5 - 11.0 k/uL    RBC 4.79 4.0 - 5.2 m/uL    Hemoglobin 14.6 12.0 - 16.0 g/dL    Hematocrit 44.0 36 - 46 %    MCV 91.9 80 - 100 fL    MCH 30.6 26 - 34 pg    MCHC 33.3 31 - 37 g/dL    RDW 12.2 (L) 12.5 - 15.4 %    Platelets 050 314 - 201 k/uL    MPV 6.8 6.0 - 12.0 fL    NRBC Automated NOT REPORTED per 100 WBC    Differential Type NOT REPORTED     Seg Neutrophils 88 (H) 36 - 66 %    Lymphocytes 8 (L) 24 - 44 %    Monocytes 4 2 - 11 %    Eosinophils % 0 (L) 1 - 4 %    Basophils 0 0 - 2 %    Immature Granulocytes NOT REPORTED 0 %    Segs Absolute 12.70 (H) 1.8 - 7.7 k/uL    Absolute Lymph # 1.10 1.0 - 4.8 k/uL    Absolute Mono # 0.50 0.1 - 1.2 k/uL    Absolute Eos # 0.00 0.0 - 0.4 k/uL    Basophils Absolute 0.10 0.0 - 0.2 k/uL    Absolute Immature Granulocyte NOT REPORTED 0.00 - 0.30 k/uL    WBC Morphology NOT REPORTED     RBC Morphology NOT REPORTED     Platelet Estimate NOT REPORTED    Comprehensive Metabolic Panel w/ Reflex to MG    Collection Time: 11/26/20  2:27 AM   Result Value Ref Range    Glucose 104 (H) 70 - 99 mg/dL    BUN 12 6 - 20 mg/dL    CREATININE 0.60 0.50 - 0.90 mg/dL    Bun/Cre Ratio NOT REPORTED 9 - 20    Calcium 9.7 8.6 - 10.4 mg/dL    Sodium 138 135 - 144 mmol/L    Potassium 3.9 3.7 - 5.3 mmol/L    Chloride 103 98 - 107 mmol/L    CO2 21 20 - 31 mmol/L    Anion Gap 14 9 - 17 mmol/L    Alkaline Phosphatase 38 35 - 104 U/L    ALT 14 5 - 33 U/L    AST 19 <32 U/L    Total Bilirubin 0.40 0.3 - 1.2 mg/dL    Total Protein 7.3 6.4 - 8.3 g/dL    Alb 4.1 3.5 - 5.2 g/dL    Albumin/Globulin Ratio 1.3 1.0 - 2.5    GFR Non-African American >60 >60 mL/min    GFR African American >60 >60 mL/min    GFR Comment          GFR Staging NOT REPORTED    Lipase    Collection Time: 11/26/20  2:27 AM   Result Value Ref Range    Lipase 29 13 - 60 U/L   Amylase    Collection Time: 11/26/20  2:27 AM   Result Value Ref Range    Amylase 79 28 - 100 U/L   Urinalysis Reflex to Culture    Collection Time: 11/26/20  3:03 AM    Specimen: Urine, clean catch   Result Value Ref Range    Color, UA YELLOW YELLOW    Turbidity UA SLIGHTLY CLOUDY (A) CLEAR    Glucose, Ur NEGATIVE NEGATIVE    Bilirubin Urine NEGATIVE NEGATIVE    Ketones, Urine TRACE (A) NEGATIVE    Specific Gravity, UA 1.025 1.005 - 1.030    Urine Hgb MODERATE (A) NEGATIVE    pH, UA 6.5 5.0 - 8.0    Protein, UA NEGATIVE NEGATIVE    Urobilinogen, Urine Normal Normal    Nitrite, Urine NEGATIVE NEGATIVE    Leukocyte Esterase, Urine SMALL (A) NEGATIVE    Urinalysis Comments NOT REPORTED    Pregnancy, Urine    Collection Time: 11/26/20  3:03 AM   Result Value Ref Range    HCG(Urine) Pregnancy Test NEGATIVE NEGATIVE   Microscopic Urinalysis    Collection Time: 11/26/20 3:03 AM   Result Value Ref Range    -          WBC, UA 10 TO 20 0 - 5 /HPF    RBC, UA 10 TO 20 0 - 2 /HPF    Casts UA NOT REPORTED 0 - 2 /LPF    Crystals, UA NOT REPORTED None /HPF    Epithelial Cells UA 50  0 - 5 /HPF    Renal Epithelial, UA NOT REPORTED 0 /HPF    Bacteria, UA MODERATE (A) None    Mucus, UA NOT REPORTED None    Trichomonas, UA NOT REPORTED None    Amorphous, UA NOT REPORTED None    Other Observations UA Culture ordered based on defined criteria. (A) NOT REQ. Yeast, UA NOT REPORTED None   COVID-19    Collection Time: 11/26/20  4:05 AM    Specimen: Other   Result Value Ref Range    SARS-CoV-2          SARS-CoV-2, Rapid Not Detected Not Detected    Source . NASOPHARYNGEAL SWAB     SARS-CoV-2         TSH with Reflex    Collection Time: 11/26/20  8:43 AM   Result Value Ref Range    TSH 0.65 0.30 - 5.00 mIU/L       Imaging/Diagnostics:    Xr Acute Abd Series Chest 1 Vw    Result Date: 11/26/2020  Multiple loops of moderately distended small bowel with air-fluid levels which could represent small bowel ileus versus early/partial small bowel obstruction. Continued follow-up and further evaluation is recommended. Consider further assessment with contrast-enhanced CT of the abdomen pelvis. Ct Abdomen Pelvis W Iv Contrast Additional Contrast? None    Result Date: 11/26/2020  1. Small-bowel obstruction to the level of the distal jejunum at the site of inflammatory stricture related to the patient's history of Crohn's disease. Associated mesenteric edema and small volume abdominopelvic ascites. No evidence for abscess or fistula. 2.  Separate long segment small bowel inflammation involving the distal and terminal ileum again noted.        Assessment :      Hospital Problems           Last Modified POA    * (Principal) SBO (small bowel obstruction) ([facility] 11/26/2020 Yes    Crohn's disease, unspecified, with unspecified complications ([facility] 88/47/0432 Yes    Generalized abdominal pain 11/26/2020 Yes Plan:     Patient status inpatient in the Med/Surge    1. General surgery consult. Appreciate input  2. IV Solu-Medrol 40 mg twice daily  3. Strict n.p.o. to allow for bowel rest  4. IV Dilaudid as needed for pain  5. Encourage ambulation  6. IV hydration as ordered  7. DVT prophylaxis  8. BMP, CBC in a.m.  9. Replace electrolytes as needed    Consultations:   [facility]    Patient is admitted as inpatient status because of co-morbidities listed above, severity of signs and symptoms as outlined, requirement for current medical therapies and most importantly because of direct risk to patient if care not provided in a hospital setting. Expected length of stay > 48 hours.     SUDHIR Brown NP  11/26/2020  5:29 PM    Copy sent to Dr. Yvan Young MD

## 2020-11-26 NOTE — ED PROVIDER NOTES
[address] ED  EMERGENCY DEPARTMENT ENCOUNTER      Pt Name: Amanda Devi  MRN: [id number]  [place] 1995  Date of evaluation: 11/26/2020  Provider: Judith Irizarry MD    CHIEF COMPLAINT       Chief Complaint   Patient presents with    Abdominal Pain     HISTORY OF PRESENT ILLNESS  (Location/Symptom, Timing/Onset, Context/Setting, Quality, Duration, Modifying Factors, Severity.)   Amanda Devi is a 23 y.o. female who presents to the emergency department complaining of abdominal pain. She relates that she has been diagnosed with Crohn's about a year now. She relates that she had just started to increase her diet with some stirfry yesterday. And now today she feels like when she typically has an obstruction. She tells me lab work usually looks fine and its the CT scan that showed the obstruction. She also tells the nurse that she does not think morphine works well for her and she usually likes Dilaudid. She denies any problems with urination. She currently has no problems with bowel habits. She denies any fever. No chest pain or shortness of breath. She does feel nauseous at this time. Nursing Notes were reviewed. REVIEW OF SYSTEMS    (2-9 systems for level 4, 10 or more for level 5)     Review of Systems   Constitutional: Negative for activity change, appetite change, chills, fatigue and fever. HENT: Negative for congestion, ear pain and sore throat. Eyes: Negative for pain, discharge and redness. Respiratory: Negative for cough, shortness of breath, wheezing and stridor. Cardiovascular: Negative for chest pain. Gastrointestinal: Positive for abdominal pain and nausea. Negative for constipation, diarrhea and vomiting. Genitourinary: Negative for decreased urine volume and difficulty urinating. Musculoskeletal: Negative for arthralgias and myalgias. Skin: Negative for color change and rash. Neurological: Negative for dizziness, weakness and headaches. Psychiatric/Behavioral: Negative for behavioral problems and confusion. Except as noted above the remainder of the review of systems was reviewed and negative. PAST MEDICAL HISTORY     Past Medical History:   Diagnosis Date    Anxiety     [facility]um [facility]     Depression     Mood disorder ([facility]     Seasonal allergies        SURGICAL HISTORY       Past Surgical History:   Procedure Laterality Date    COLONOSCOPY  09/16/2019    COLONOSCOPY N/A 10/16/2020    COLONOSCOPY WITH BIOPSY performed by Jerome Ledbetter MD at [place] [address] N/A 10/16/2020    EGD BIOPSY performed by Jerome Ledbetter MD at [address] [place] [address]       Discharge Medication List as of 11/26/2020  6:04 AM      CONTINUE these medications which have NOT CHANGED    Details   Zabrina [place] 0.15-0.03 MG per tablet TAKE ONE TABLET BY MOUTH DAILY, Disp-91 tablet,R-0Normal      metroNIDAZOLE (METROCREAM) 0.75 % cream Apply topically 2 times daily as needed Apply topically 2 times daily. , Topical, 2 TIMES DAILY PRN, Historical Med      polyethylene glycol (GLYCOLAX) 17 g packet Take 17 g by mouth dailyHistorical Med      !! lamoTRIgine (LAMICTAL) 150 MG tablet Take 150 mg by mouth dailyHistorical Med      traMADol (ULTRAM ER) 100 MG TB24 extended release tablet Take 1 tablet by mouth daily as needed for Pain for up to 180 days. The day after Entyvio infusion. , Disp-1 tablet,R-2Print      vedolizumab (ENTYVIO) 300 MG injection Infuse 300 mg intravenously Patient is taking this every 2 months. Historical Med      Sulfacetamide-Sulfur-Cleanser (SULFACETAMIDE SOD-SULFUR [facility]) 9-4.5 % KIT Wash face twice daily - can dispense any covered sulfacetamide wash, Disp-1 kit,R-3Normal      Probiotic Product (PROBIOTIC-10 PO) Take 1 capsule by mouth daily Historical Med      Multiple Vitamins-Minerals (MULTIVITAMIN PO) Take 1 tablet by mouth daily Historical Med      hydrocortisone 2.5 % cream Apply topically 2 times daily. , Disp-20 Tube, R-3, Normal      famotidine (PEPCID) 20 MG tablet Take 1 tablet by mouth 2 times daily, Disp-60 tablet,R-3Normal      SODIUM FLUORIDE, DENTAL GEL, 1.1 % CREA Place onto teethHistorical Med      buPROPion (WELLBUTRIN XL) 150 MG extended release tablet Take 150 mg by mouth every morningHistorical Med      Doxycycline Hyclate 50 MG TABS Take 1 tablet daily, Disp-90 tablet,R-0Normal      dicyclomine (BENTYL) 10 MG capsule 1-2 capsules prnHistorical Med      cetirizine (ZYRTEC) 10 MG tablet Take 1 tablet by mouth daily, Disp-30 tablet, R-3Normal      azaTHIOprine (IMURAN) 50 MG tablet Take 50 mg by mouth daily Historical Med      !! lamoTRIgine (LAMICTAL) 200 MG tablet Take 200 mg by mouth daily Historical Med      Cholecalciferol (VITAMIN D) 2000 units CAPS capsule Take 1 capsule by mouth daily Historical Med      pimecrolimus (ELIDEL) 1 % cream Apply topically 2 times daily. , Disp-100 g, R-1, Normal       !! - Potential duplicate medications found. Please discuss with provider.           ALLERGIES     Iv contrast [iodides] and Nsaids    FAMILY HISTORY           Problem Relation Age of Onset    Bipolar Disorder Mother     Other Mother     Alcohol Abuse Mother     Obesity Father     Hypertension Father     Alcohol Abuse Maternal Aunt     Bipolar Disorder Maternal Grandfather     Diabetes Maternal Grandfather     Alcohol Abuse Maternal Grandfather     High Blood Pressure Maternal Grandfather     Stroke Maternal Grandfather     Other Sister         gastroparesis    No Known Problems Brother     Miscarriages / Stillbirths Neg Hx     Coronary Art Dis Neg Hx     Colon Cancer Neg Hx     Uterine Cancer Neg Hx     Ovarian Cancer Neg Hx     Breast Cancer Neg Hx      Family Status   Relation Name Status    Mother  Alive    Father  Alive    MAunt  (Not Specified)    MGF  (Not Specified)    Sister  Alive    Brother  Alive    Neg Hx  (Not Specified) SOCIAL HISTORY      reports that she has never smoked. She has never used smokeless tobacco. She reports current alcohol use of about 2.0 standard drinks of alcohol per week. She reports that she does not use drugs. PHYSICAL EXAM    (up to 7 for level 4, 8 or more for level 5)     ED Triage Vitals [11/26/20 0053]   BP Temp Temp Source Pulse Resp SpO2 Height Weight   104/66 97.9 °F (36.6 °C) Oral 100 18 100 % -- --     Physical Exam  Vitals signs and nursing note reviewed. Constitutional:       General: She is not in acute distress. Appearance: She is well-developed. She is not ill-appearing, toxic-appearing or diaphoretic. HENT:      Head: Normocephalic and atraumatic. Right Ear: External ear normal.      Left Ear: External ear normal.      Nose: Nose normal.      Mouth/Throat:      Mouth: Mucous membranes are moist.   Eyes:      General:         Right eye: No discharge. Left eye: No discharge. Extraocular Movements: Extraocular movements intact. Conjunctiva/sclera: Conjunctivae normal.      Pupils: Pupils are equal, round, and reactive to light. Neck:      Musculoskeletal: Normal range of motion and neck supple. Cardiovascular:      Rate and Rhythm: Regular rhythm. Tachycardia present. Heart sounds: Normal heart sounds. No murmur. Pulmonary:      Effort: Pulmonary effort is normal. No respiratory distress. Breath sounds: Normal breath sounds. No wheezing, rhonchi or rales. Chest:      Chest wall: No tenderness. Abdominal:      General: Bowel sounds are normal. There is no distension. Palpations: Abdomen is soft. There is no mass. Tenderness: There is generalized abdominal tenderness. There is no guarding or rebound. Musculoskeletal: Normal range of motion. Right lower leg: No edema. Left lower leg: No edema. Skin:     General: Skin is warm. Findings: No rash. Neurological:      General: No focal deficit present.       Mental Status: She is alert and oriented to person, place, and time. Motor: No abnormal muscle tone. Psychiatric:         Mood and Affect: Mood normal.         Behavior: Behavior normal.         DIAGNOSTIC RESULTS     EKG: All EKG's are interpreted by the Emergency Department Physician who either signs or Co-signs this chart in the absence of a cardiologist.    None    RADIOLOGY:   Non-plain film images such as CT, Ultrasound and MRI are read by the radiologist. Plain radiographic images are visualized and preliminarily interpreted by the emergency physician with the below findings:    Interpretation per the Radiologist below, if available at the time of this note:    XR ACUTE ABD SERIES CHEST 1 VW   Final Result   Multiple loops of moderately distended small bowel with air-fluid levels   which could represent small bowel ileus versus early/partial small bowel   obstruction. Continued follow-up and further evaluation is recommended. Consider further assessment with contrast-enhanced CT of the abdomen pelvis.                ED BEDSIDE ULTRASOUND:   Performed by ED Physician - none    LABS:  Labs Reviewed   CBC WITH AUTO DIFFERENTIAL - Abnormal; Notable for the following components:       Result Value    WBC 14.4 (*)     RDW 12.2 (*)     Seg Neutrophils 88 (*)     Lymphocytes 8 (*)     Eosinophils % 0 (*)     Segs Absolute 12.70 (*)     All other components within normal limits   COMPREHENSIVE METABOLIC PANEL W/ REFLEX TO MG FOR LOW K - Abnormal; Notable for the following components:    Glucose 104 (*)     All other components within normal limits   URINE RT REFLEX TO CULTURE - Abnormal; Notable for the following components:    Turbidity UA SLIGHTLY CLOUDY (*)     Ketones, Urine TRACE (*)     Urine Hgb MODERATE (*)     Leukocyte Esterase, Urine SMALL (*)     All other components within normal limits   MICROSCOPIC URINALYSIS - Abnormal; Notable for the following components:    Bacteria, UA MODERATE (*)     Other Observations UA Culture ordered based on defined criteria. (*)     All other components within normal limits   CULTURE, URINE   LIPASE   AMYLASE   PREGNANCY, URINE   COVID-19       All other labs were within normal range or not returned as of this dictation. EMERGENCY DEPARTMENT COURSE and DIFFERENTIAL DIAGNOSIS/MDM:   Vitals:    Vitals:    11/26/20 0053 11/26/20 0324 11/26/20 0531   BP: 104/66  124/81   Pulse: 100  91   Resp: 18  16   Temp: 97.9 °F (36.6 °C)     TempSrc: Oral     SpO2: 100%  100%   Weight:  61.2 kg (135 lb)      We did discuss lab work and imaging. She requests pain medication. I have answered any questions she has at this time. I did let the patient know the results and my concerns. We talked about admission. [facility] is her top choice but there are no beds and they are currently on bypass. Attempting [facility] for this reason. She is agreeable. She relates that if not there she would be okay with [facility] as well. CONSULTS:  I did speak with hospitalist, Jonah Vasquez CNP who accepts the patient with no further orders. We did discuss that general surgery should probably be involved and access is paging them for us. Dr. Darryl Hernandez, gen surgery has been paged. Awaiting callback at this time. PROCEDURES:  None    FINAL IMPRESSION      1. Urinary tract infection without hematuria, site unspecified    2. Small bowel obstruction ([address]    3. Drug-seeking behavior          DISPOSITION/PLAN   DISPOSITION  transfer    PATIENT REFERRED TO:  No follow-up provider specified.     DISCHARGE MEDICATIONS:  Discharge Medication List as of 11/26/2020  6:04 AM          (Please note that portions of this note were completed with a voice recognition program.  Efforts were made to edit the dictations but occasionally words are mis-transcribed.)    Jazmin Fermin MD  Attending Emergency Physician        Jazmin Fermin MD  11/26/20 1911

## 2020-11-26 NOTE — PROGRESS NOTES
Occupational [address]  Occupational Therapy Not Seen Note    DATE: 2020  Name: Yojana Pryor  : 1995  MRN: [id number]    Patient not available for Occupational Therapy due to:    [] Testing:    [] Hemodialysis    [] Blood Transfusion in Progress    []Refusal by Patient:    [] Surgery/Procedure:    [] Strict Bedrest    [] Sedation    [] Spine Precautions     [] Pt with medical decline and not appropriate for continued therapy services. Spoke with pt/family and OT services to be defered. [x] Pt independent with functional mobility and functional tasks. Pt demo ability to perform bed mobility, don socks/shoes while seated EOB, and perform functional mobility within hospital room/hallway independently. Pt denies any safety concerns for returning home at discharge.  Pt with no OT acute care needs at this time, will defer OT eval.      Álvaro Fan OTR/L

## 2020-11-27 PROBLEM — R00.0 TACHYCARDIA: Status: ACTIVE | Noted: 2020-11-27

## 2020-11-27 LAB
ANION GAP SERPL CALCULATED.3IONS-SCNC: 11 MMOL/L (ref 9–17)
BUN BLDV-MCNC: 8 MG/DL (ref 6–20)
BUN/CREAT BLD: ABNORMAL (ref 9–20)
CALCIUM SERPL-MCNC: 8.9 MG/DL (ref 8.6–10.4)
CHLORIDE BLD-SCNC: 106 MMOL/L (ref 98–107)
CO2: 19 MMOL/L (ref 20–31)
CREAT SERPL-MCNC: 0.58 MG/DL (ref 0.5–0.9)
CULTURE: NORMAL
GFR AFRICAN AMERICAN: >60 ML/MIN
GFR NON-AFRICAN AMERICAN: >60 ML/MIN
GFR SERPL CREATININE-BSD FRML MDRD: ABNORMAL ML/MIN/{1.73_M2}
GFR SERPL CREATININE-BSD FRML MDRD: ABNORMAL ML/MIN/{1.73_M2}
GLUCOSE BLD-MCNC: 110 MG/DL (ref 70–99)
HCT VFR BLD CALC: 41 % (ref 36–46)
HEMOGLOBIN: 13.6 G/DL (ref 12–16)
Lab: NORMAL
MCH RBC QN AUTO: 31 PG (ref 26–34)
MCHC RBC AUTO-ENTMCNC: 33.2 G/DL (ref 31–37)
MCV RBC AUTO: 93.4 FL (ref 80–100)
NRBC AUTOMATED: ABNORMAL PER 100 WBC
PDW BLD-RTO: 11.6 % (ref 12.5–15.4)
PLATELET # BLD: 341 K/UL (ref 140–450)
PMV BLD AUTO: 8.8 FL (ref 8–14)
POTASSIUM SERPL-SCNC: 4.5 MMOL/L (ref 3.7–5.3)
RBC # BLD: 4.39 M/UL (ref 4–5.2)
SODIUM BLD-SCNC: 136 MMOL/L (ref 135–144)
SPECIMEN DESCRIPTION: NORMAL
WBC # BLD: 9.1 K/UL (ref 3.5–11)

## 2020-11-27 PROCEDURE — 6360000002 HC RX W HCPCS: Performed by: NURSE PRACTITIONER

## 2020-11-27 PROCEDURE — 36415 COLL VENOUS BLD VENIPUNCTURE: CPT

## 2020-11-27 PROCEDURE — 96375 TX/PRO/DX INJ NEW DRUG ADDON: CPT

## 2020-11-27 PROCEDURE — 2580000003 HC RX 258: Performed by: NURSE PRACTITIONER

## 2020-11-27 PROCEDURE — 96376 TX/PRO/DX INJ SAME DRUG ADON: CPT

## 2020-11-27 PROCEDURE — 96361 HYDRATE IV INFUSION ADD-ON: CPT

## 2020-11-27 PROCEDURE — 6370000000 HC RX 637 (ALT 250 FOR IP): Performed by: NURSE PRACTITIONER

## 2020-11-27 PROCEDURE — 99232 SBSQ HOSP IP/OBS MODERATE 35: CPT | Performed by: INTERNAL MEDICINE

## 2020-11-27 PROCEDURE — 80048 BASIC METABOLIC PNL TOTAL CA: CPT

## 2020-11-27 PROCEDURE — 2060000000 HC ICU INTERMEDIATE R&B

## 2020-11-27 PROCEDURE — 2580000003 HC RX 258: Performed by: INTERNAL MEDICINE

## 2020-11-27 PROCEDURE — 85027 COMPLETE CBC AUTOMATED: CPT

## 2020-11-27 PROCEDURE — G0378 HOSPITAL OBSERVATION PER HR: HCPCS

## 2020-11-27 PROCEDURE — APPSS30 APP SPLIT SHARED TIME 16-30 MINUTES: Performed by: NURSE PRACTITIONER

## 2020-11-27 RX ORDER — 0.9 % SODIUM CHLORIDE 0.9 %
500 INTRAVENOUS SOLUTION INTRAVENOUS ONCE
Status: COMPLETED | OUTPATIENT
Start: 2020-11-27 | End: 2020-11-27

## 2020-11-27 RX ADMIN — HYDROMORPHONE HYDROCHLORIDE 0.25 MG: 1 INJECTION, SOLUTION INTRAMUSCULAR; INTRAVENOUS; SUBCUTANEOUS at 00:18

## 2020-11-27 RX ADMIN — LEVONORGESTREL AND ETHINYL ESTRADIOL 1 TABLET: KIT at 09:17

## 2020-11-27 RX ADMIN — LAMOTRIGINE 150 MG: 25 TABLET ORAL at 09:15

## 2020-11-27 RX ADMIN — DIPHENHYDRAMINE HYDROCHLORIDE 12.5 MG: 50 INJECTION, SOLUTION INTRAMUSCULAR; INTRAVENOUS at 00:18

## 2020-11-27 RX ADMIN — METHYLPREDNISOLONE SODIUM SUCCINATE 40 MG: 40 INJECTION, POWDER, FOR SOLUTION INTRAMUSCULAR; INTRAVENOUS at 17:30

## 2020-11-27 RX ADMIN — METHYLPREDNISOLONE SODIUM SUCCINATE 40 MG: 40 INJECTION, POWDER, FOR SOLUTION INTRAMUSCULAR; INTRAVENOUS at 04:40

## 2020-11-27 RX ADMIN — SODIUM CHLORIDE 500 ML: 9 INJECTION, SOLUTION INTRAVENOUS at 16:48

## 2020-11-27 RX ADMIN — Medication 10 ML: at 09:15

## 2020-11-27 RX ADMIN — SODIUM CHLORIDE: 9 INJECTION, SOLUTION INTRAVENOUS at 06:10

## 2020-11-27 ASSESSMENT — PAIN DESCRIPTION - DESCRIPTORS: DESCRIPTORS: DULL

## 2020-11-27 ASSESSMENT — PAIN DESCRIPTION - PAIN TYPE: TYPE: ACUTE PAIN

## 2020-11-27 ASSESSMENT — PAIN SCALES - GENERAL
PAINLEVEL_OUTOF10: 0
PAINLEVEL_OUTOF10: 5
PAINLEVEL_OUTOF10: 0

## 2020-11-27 ASSESSMENT — PAIN DESCRIPTION - PROGRESSION: CLINICAL_PROGRESSION: NOT CHANGED

## 2020-11-27 ASSESSMENT — PAIN DESCRIPTION - LOCATION: LOCATION: ABDOMEN

## 2020-11-27 ASSESSMENT — PAIN DESCRIPTION - FREQUENCY: FREQUENCY: INTERMITTENT

## 2020-11-27 ASSESSMENT — PAIN DESCRIPTION - ONSET: ONSET: ON-GOING

## 2020-11-27 ASSESSMENT — PAIN - FUNCTIONAL ASSESSMENT: PAIN_FUNCTIONAL_ASSESSMENT: ACTIVITIES ARE NOT PREVENTED

## 2020-11-27 NOTE — PROGRESS NOTES
PATIENT NAME: Ulisses Baig     TODAY'S DATE: 11/27/2020, 3:19 PM    SUBJECTIVE:    22 y/o female with small bowel obstruction secondary to Crohn's enteritis doing better today. Abdominal pain resolved. Tolerated regular diet. Patient having formed bowel movements. Denies melena or hematochezia. Denies fevers, chills, or sweats. OBJECTIVE:   VITALS:  /72   Pulse 98   Temp 98.1 °F (36.7 °C) (Oral)   Resp 18   Ht 5' 1\" (1.549 m)   Wt 157 lb 3.2 oz (71.3 kg)   SpO2 97%   BMI 29.70 kg/m²      INTAKE/OUTPUT:      Intake/Output Summary (Last 24 hours) at 11/27/2020 1519  Last data filed at 11/27/2020 1134  Gross per 24 hour   Intake 1866.67 ml   Output 1250 ml   Net 616.67 ml                 CONSTITUTIONAL:  awake and alert. No acute distress  ABDOMEN:   Abdomen soft, non-tender. BS normal. No masses,  No organomegaly    Data:  CBC:   Lab Results   Component Value Date    WBC 9.1 11/27/2020    RBC 4.39 11/27/2020    HGB 13.6 11/27/2020    HCT 41.0 11/27/2020    MCV 93.4 11/27/2020    MCH 31.0 11/27/2020    MCHC 33.2 11/27/2020    RDW 11.6 11/27/2020     11/27/2020    MPV 8.8 11/27/2020       ASSESSMENT   1. Resolving small bowel obstruction secondary to Crohn's enteritis    Plan  1. Patient with benign abdominal exam.  Patient does not have an acute surgical abdomen. Okay to discharge to home on surgery stand point. Recommend follow up with GI as outpatient for medical management of Crohn's.      Electronically signed by Herson Mendoza IV, DO  on 11/27/2020 at 3:19 PM

## 2020-11-27 NOTE — PROGRESS NOTES
[facility]  Office: [address], DO, Dolly Washingtoner, DO, Sincere Herron, DO, Roosevelt Cortez Blood, DO, Harsha Altman MD, Addison Ritter MD, Ryann Ordonez MD, Faisal Omalley MD, Garry Irvin MD, Gavino Escamilla MD, Ruthie Miramontes MD, Leonila Rae MD, Reyna Mayorga MD, Hector Mccormack DO, Arina Dangelo MD, Jose Ramon Quinonez MD, Emely Aaron DO, Roya Benjamin MD,  Michaelle Temple DO, Norris Holter, MD, Niko Sandy MD, Sarah Marshall CNP, [facility] Maura, CNP, Salima Gutierrez CNP, Campbell Alva, CNS, Omari Wagner, CNP, Clifton Schroeder, CNP, Ayesha Duarte, CNP, Donaldo Parker, CNP, Camilo Swift, CNP, Forest Zamora PA-C, Hua Donato DNP, Melba Hancock, CNP, Sophia Cristobal, CNP, John Velez, CNP, Henrique Quinonez, CNP, Ondina Garibay [address]    Progress Note    11/27/2020    4:24 PM    Name:   Amanda Devi  MRN:     [id number]     Acct:      [de-identified]   Room:   [address] [place] [address]-[facility] Day:  1  Admit Date:  11/26/2020  6:10 AM    PCP:   Mehran Rodriguez MD  Code Status:  Full Code    Subjective:     C/C: Feeling better  Interval History Status: improved. Patient resting in bed. She says her abdominal pain has resolved. She also reports she has had a normal bowel movement for her. She has no complaints of nausea or vomiting. She denies fever, chills, chest pain, shortness of breath, dizziness. She says she had a headache overnight but it has since resolved. On telemetry her heart rate increases towards 130s with ambulation. IV hydration continues. She is tolerating the diet. Brief History:     Per previous documentation:  Amanda Devi is a 23 y.o. Non-/non  female who presents with No chief complaint on file. and is admitted to the hospital for the management of SBO (small bowel obstruction) ([facility]      Patient was diagnosed with Crohn's disease approximately a year ago and follows with Father     Alcohol Abuse Maternal Aunt     Bipolar Disorder Maternal Grandfather     Diabetes Maternal Grandfather     Alcohol Abuse Maternal Grandfather     High Blood Pressure Maternal Grandfather     Stroke Maternal Grandfather     Other Sister         gastroparesis    No Known Problems Brother     Miscarriages / Stillbirths Neg Hx     Coronary Art Dis Neg Hx     Colon Cancer Neg Hx     Uterine Cancer Neg Hx     Ovarian Cancer Neg Hx     Breast Cancer Neg Hx        Vitals:  /62   Pulse 113   Temp 99 °F (37.2 °C) (Oral)   Resp 20   Ht 5' 1\" (1.549 m)   Wt 157 lb 3.2 oz (71.3 kg)   SpO2 97%   BMI 29.70 kg/m²   Temp (24hrs), Av.4 °F (36.9 °C), Min:98.1 °F (36.7 °C), Max:99 °F (37.2 °C)    No results for input(s): POCGLU in the last 72 hours. I/O (24Hr):     Intake/Output Summary (Last 24 hours) at 2020 1624  Last data filed at 2020 1134  Gross per 24 hour   Intake 1866.67 ml   Output 1250 ml   Net 616.67 ml       Labs:  Hematology:  Recent Labs     20  0727   WBC 14.4* 9.1   RBC 4.79 4.39   HGB 14.6 13.6   HCT 44.0 41.0   MCV 91.9 93.4   MCH 30.6 31.0   MCHC 33.3 33.2   RDW 12.2* 11.6*    341   MPV 6.8 8.8     Chemistry:  Recent Labs     20  0727    136   K 3.9 4.5    106   CO2 21 19*   GLUCOSE 104* 110*   BUN 12 8   CREATININE 0.60 0.58   ANIONGAP 14 11   LABGLOM >60 >60   GFRAA >60 >60   CALCIUM 9.7 8.9     Recent Labs     20  0843   PROT 7.3  --    LABALBU 4.1  --    TSH  --  0.65   AST 19  --    ALT 14  --    ALKPHOS 38  --    BILITOT 0.40  --    AMYLASE 79  --    LIPASE 29  --        Lab Results   Component Value Date/Time    SPECIAL NOT REPORTED 2020 03:03 AM     Lab Results   Component Value Date/Time    CULTURE NO SIGNIFICANT GROWTH 2020 03:03 AM       Radiology:  Joshua So Acute Abd Series Chest 1 Vw    Result Date: 2020  Multiple loops of moderately distended small bowel with air-fluid levels which could represent small bowel ileus versus early/partial small bowel obstruction. Continued follow-up and further evaluation is recommended. Consider further assessment with contrast-enhanced CT of the abdomen pelvis. Ct Abdomen Pelvis W Iv Contrast Additional Contrast? None    Result Date: 11/26/2020  1. Small-bowel obstruction to the level of the distal jejunum at the site of inflammatory stricture related to the patient's history of Crohn's disease. Associated mesenteric edema and small volume abdominopelvic ascites. No evidence for abscess or fistula. 2.  Separate long segment small bowel inflammation involving the distal and terminal ileum again noted. Physical Examination:       General appearance:  alert, cooperative and no distress  Mental Status:  oriented to person, place and time and normal affect  Lungs:  clear to auscultation bilaterally, normal effort  Heart: Tachy regular rate and rhythm, no murmur  Abdomen:  soft, nontender, rounded, hypoactive bowel sounds, no masses, hepatomegaly, splenomegaly  Extremities:  no edema, redness, tenderness in the calves  Skin:  no gross lesions, rashes, induration    Assessment:     Hospital Problems           Last Modified POA    * (Principal) SBO (small bowel obstruction) ([address] 11/26/2020 Yes    Crohn's disease of small and large intestines with complication ([address] 09/74/8238 Yes    Generalized abdominal pain 11/26/2020 Yes    Tachycardia 11/27/2020 Yes          Plan:     1. Low fiber diet  2. Continue IV Solu-Medrol twice daily  3. Dietitian following. Appreciate input  4. Continue IV hydration. 5. 500 mL fluid bolus x1  6. Monitor for abdominal pain  7. Continue telemetry. Monitor heart rate  8. DVT prophylaxis    Plan for discharge in a.m. if patient continues to tolerate diet and heart rate is within normal limits.     SUDHIR Vang NP  11/27/2020  4:24 PM

## 2020-11-27 NOTE — CARE COORDINATION
Case Management Initial Discharge Plan  Fletcher Munguia,             Met with:patient to discuss discharge plans. Information verified: address, contacts, phone number, , insurance Yes    Emergency Contact/Next of Kin name & number: darron Graham [phone], [phone]    PCP: Hernán Scott MD  Date of last visit: past year    Insurance Provider: Doug Navarrete    Discharge Planning    Living Arrangements:  Spouse/Significant Other   Support Systems:  Spouse/Significant Other, Parent    Home has 1 stories   stairs to climb to get into front door, stairs to climb to reach second floor  Location of bedroom/bathroom in home     Patient able to perform ADL's:Independent    Current Services (outpatient & in home) non  DME equipment:   DME provider:     Receiving oral anticoagulation therapy? No    If indicated:   Physician managing anticoagulation treatment:   Where does patient obtain lab work for ATC treatment? Potential Assistance Needed:  N/A    Patient agreeable to home care: No  [place] of choice provided:  no    Prior SNF/Rehab Placement and Facility: no  Agreeable to SNF/Rehab: No  [place] of choice provided: no     Evaluation: no    Expected Discharge date:  20    Patient expects to be discharged to:  home  Follow Up Appointment: Best Day/ Time:      Transportation provider: self  Transportation arrangements needed for discharge: No    Readmission Risk              Risk of Unplanned Readmission:        14         Does patient have a readmission risk score greater than 14?: No  If yes, follow-up appointment must be made within 7 days of discharge.      Goals of Care: control obstruction      Discharge Plan: home with family, independent - follows with GI as OP for Crohn's - [facility] treatment          Electronically signed by Jamie Chambers RN on 20 at 2:43 PM EST

## 2020-11-27 NOTE — PROGRESS NOTES
Nutrition Education    · Verbally reviewed information with Patient: Pt reports she has had several Crohn's diet educations in the past. Pt with no questions at this time but accepted nutrition education handout. · Educated on [place] [facility]  · Written educational materials provided. · Contact name and number provided. · Refer to Patient Education activity for more details.     Electronically signed by Horacio Pena RD, LD on 11/27/20 at 11:02 AM [address], CATHY NEGRO, LD  Registered Dietitian  [place]  [phone]

## 2020-11-27 NOTE — PLAN OF CARE
Problem: Pain:  Pain level assessment complete.    Patient educated on pain scale and control interventions  PRN pain medication given per patient request  Patient instructed to call out with new onset of pain or unrelieved pain    Goal: Pain level will decrease  Description: Pain level will decrease  11/26/2020 2324 by Marcelina Zacarias RN  Outcome: Ongoing  11/26/2020 1100 by Saira Guerrero RN  Outcome: Ongoing  Goal: Control of acute pain  Description: Control of acute pain  11/26/2020 2324 by Marcelina Zacarias RN  Outcome: Ongoing  11/26/2020 1100 by Saira Guerrero RN  Outcome: Ongoing  Goal: Control of chronic pain  Description: Control of chronic pain  11/26/2020 2324 by Marcelina Zacarias RN  Outcome: Ongoing  11/26/2020 1100 by Saira Guerrero RN  Outcome: Ongoing  Goal: Patient's pain/discomfort is manageable  Description: Patient's pain/discomfort is manageable  11/26/2020 2324 by Marcelina Zacarias RN  Outcome: Ongoing  11/26/2020 1100 by Saira Guerrero RN  Outcome: Ongoing          Problem: Daily Care:  ADLs addressed and assessed  Goal: Daily care needs are met  Description: Daily care needs are met  11/26/2020 2324 by Marcelina Zacarias RN  Outcome: Ongoing  11/26/2020 1100 by Saira Guerrero RN  Outcome: Ongoing        Problem: Discharge Planning:  Goal: Patients continuum of care needs are met  Description: Patients continuum of care needs are met  11/26/2020 2324 by Marcelina Zacarias RN  Outcome: Ongoing  11/26/2020 1100 by Saira Guerrero RN  Outcome: Ongoing

## 2020-11-28 VITALS
BODY MASS INDEX: 29.85 KG/M2 | WEIGHT: 158.1 LBS | SYSTOLIC BLOOD PRESSURE: 123 MMHG | HEART RATE: 92 BPM | HEIGHT: 61 IN | TEMPERATURE: 98.7 F | OXYGEN SATURATION: 97 % | DIASTOLIC BLOOD PRESSURE: 73 MMHG | RESPIRATION RATE: 18 BRPM

## 2020-11-28 PROBLEM — R00.0 TACHYCARDIA: Status: RESOLVED | Noted: 2020-11-27 | Resolved: 2020-11-28

## 2020-11-28 PROBLEM — K56.609 SBO (SMALL BOWEL OBSTRUCTION) (HCC): Status: RESOLVED | Noted: 2020-05-30 | Resolved: 2020-11-28

## 2020-11-28 PROCEDURE — 96376 TX/PRO/DX INJ SAME DRUG ADON: CPT

## 2020-11-28 PROCEDURE — 2580000003 HC RX 258: Performed by: NURSE PRACTITIONER

## 2020-11-28 PROCEDURE — G0378 HOSPITAL OBSERVATION PER HR: HCPCS

## 2020-11-28 PROCEDURE — APPSS30 APP SPLIT SHARED TIME 16-30 MINUTES: Performed by: NURSE PRACTITIONER

## 2020-11-28 PROCEDURE — 6370000000 HC RX 637 (ALT 250 FOR IP): Performed by: NURSE PRACTITIONER

## 2020-11-28 PROCEDURE — 6360000002 HC RX W HCPCS: Performed by: NURSE PRACTITIONER

## 2020-11-28 PROCEDURE — 99232 SBSQ HOSP IP/OBS MODERATE 35: CPT | Performed by: INTERNAL MEDICINE

## 2020-11-28 RX ORDER — BUDESONIDE 3 MG/1
6 CAPSULE, COATED PELLETS ORAL DAILY
Qty: 10 CAPSULE | Refills: 0 | Status: SHIPPED | OUTPATIENT
Start: 2020-11-28 | End: 2020-12-03

## 2020-11-28 RX ORDER — DICYCLOMINE HYDROCHLORIDE 10 MG/1
CAPSULE ORAL
Qty: 120 CAPSULE | Refills: 3 | Status: SHIPPED | OUTPATIENT
Start: 2020-11-28

## 2020-11-28 RX ORDER — MULTIVIT-MIN/IRON/FOLIC ACID/K 18-600-40
1 CAPSULE ORAL DAILY
Qty: 30 CAPSULE | Refills: 1 | Status: SHIPPED | OUTPATIENT
Start: 2020-11-28 | End: 2021-01-27

## 2020-11-28 RX ORDER — PREDNISONE 20 MG/1
20 TABLET ORAL DAILY
Qty: 5 TABLET | Refills: 0 | Status: CANCELLED | OUTPATIENT
Start: 2020-11-28 | End: 2020-12-03

## 2020-11-28 RX ORDER — BUDESONIDE 3 MG/1
6 CAPSULE, COATED PELLETS ORAL DAILY
Status: DISCONTINUED | OUTPATIENT
Start: 2020-11-28 | End: 2020-11-28 | Stop reason: HOSPADM

## 2020-11-28 RX ADMIN — METHYLPREDNISOLONE SODIUM SUCCINATE 40 MG: 40 INJECTION, POWDER, FOR SOLUTION INTRAMUSCULAR; INTRAVENOUS at 04:22

## 2020-11-28 RX ADMIN — LAMOTRIGINE 150 MG: 25 TABLET ORAL at 10:04

## 2020-11-28 RX ADMIN — LEVONORGESTREL AND ETHINYL ESTRADIOL 1 TABLET: KIT at 10:04

## 2020-11-28 RX ADMIN — SODIUM CHLORIDE: 9 INJECTION, SOLUTION INTRAVENOUS at 01:19

## 2020-11-28 ASSESSMENT — PAIN SCALES - GENERAL: PAINLEVEL_OUTOF10: 0

## 2020-11-28 NOTE — PROGRESS NOTES
[facility]  Office: [address], DO, Jenifer Brad, DO, Josh Padilla, DO, Anastasiia Mosqueda, DO, Valiant Sicard, MD, Ming Mccallum MD, Wang Proctor MD, Almaz Pizano MD, Jocy Horne MD, Virgilio Mosher MD, Myron Lundy MD, Adelso Burnham MD, Reyna Valdez MD, Guillermo Xie DO, Estrada Thompson MD, Fernando Herron MD, Yulisa Briceno DO, Michelle Castro MD,  Celia Garcia DO, Charlee Moreno MD, Roslyn Wu MD, Parag Chance, [facility], [facility], [facility], Amanda Fay, [facility], Erich Armas, CNS, Kaley Mckeon, CNP, Genie Castillo, CNP, Gin Phelps, CNP, Sebastián Soriano, CNP, Bing Yanez, CNP, Danis Goss PA-C, Celia Carter, [facility], Eddie Pelayo, CNP, Checo Salazar, CNP, Chelsie Sandoval, CNP, Fatoumata Seals, CNP, Amadou Cisneros, [address]    Progress Note    11/28/2020    7:32 AM    Name:   Vel Guillory  MRN:     [id number]     Acct:      [de-identified]   Room:   [address] [place] [address] Day:  2  Admit Date:  11/26/2020  6:10 AM    PCP:   Bob Reed MD  Code Status:  Full Code    Subjective:     C/C: Diarrhea overnight  Interval History Status: improved. Patient resting in bed. She reports she had some diarrhea overnight. Heart rate has improved. Has had no nausea or vomiting. No abdominal pain, fever, chills, shortness of breath. She continues to tolerate p.o. well. No issues reported overnight per nursing. She is afebrile. Brief History:     Per previous documentation:    Marcella Kid a 23 y.o. Non-/non  female who presents with No chief complaint on file.   and is admitted to the hospital for the management of SBO (small bowel obstruction) ([facility] [address]      Patient was diagnosed with Crohn's disease approximately a year ago and follows with Dr. Bartholomew.  She says she had a post colonoscopy follow-up visit with Dr. Jessika Sanchez 4 and was advised to advance her diet.  Patient says then she ate beef stirfry with baby corn on Tuesday.  By Tuesday evening her abdominal pain started and persisted throughout Wednesday.  Wednesday night she had a bout of dizziness to the point where she felt near syncopal with vision fading at times. Aline Zacarias had one bout of small diarrhea.  She came to the ED for further evaluation last night when her abdominal pain became severe and she was afraid she had a bowel obstruction as she has had a few in the recent past.     While in ED, x-ray of the abdomen was obtained and revealed multiple loops of moderately distended small bowel with air-fluid levels which could be representative of a small bowel ileus versus early/partial small bowel obstruction.  Dr. Milo Daniel general surgery was called for consult and patient was admitted to the inpatient unit for further management of small bowel obstruction.  Was also concern that patient could have UTI, however UA is more representative of contaminated urine and true UTI.  She has no urinary symptoms at this time.     Dr. Cindi Davison patient-no surgical intervention planned at this time. Sabrina Guadalupe was placed n.p.o., started on IV hydration, and IV steroid.  She is receiving Dilaudid for pain.  CT of the abdomen pelvis with contrast was completed and confirmed a small bowel obstruction to the level of the distal jejunum at the site of inflammatory stricture. Jake Spatz is also separate long segment small bowel inflammation involving the distal and terminal ileum.       Abdominal pain has resolved. She is tolerating a low fiber diet. Tachycardia has resolved with IV fluids and discontinuation of IV Solu-Medrol. Patient to be discharged home today on a short course of Entocort daily. She is to follow-up with her GI physician in 1 to 2 weeks.       Review of Systems:     Constitutional:  negative for chills, fevers, sweats  Respiratory:  negative for cough, dyspnea on exertion, shortness of breath, wheezing  Cardiovascular:  negative for chest pain, chest pressure/discomfort, lower extremity edema, palpitations  Gastrointestinal:  negative for abdominal pain, constipation, diarrhea, nausea, vomiting  Neurological:  negative for dizziness, headache    Medications: Allergies: Allergies   Allergen Reactions    Iv Contrast [Iodides] Itching     Patient reports her skin felt itchy and back of her throat felt itchy after receiving IV contrast    Nsaids      NO NSAIDs b/c of Crohns. Current Meds:   Scheduled Meds:    budesonide  6 mg Oral Daily    sodium chloride flush  10 mL Intravenous 2 times per day    enoxaparin  40 mg Subcutaneous Daily    lamoTRIgine  150 mg Oral Daily    levonorgestrel-ethinyl estradiol  1 tablet Oral Daily    hydrocortisone   Topical BID     Continuous Infusions:   PRN Meds: sodium chloride flush, potassium chloride **OR** potassium alternative oral replacement **OR** potassium chloride, magnesium sulfate, acetaminophen **OR** acetaminophen, polyethylene glycol, promethazine **OR** ondansetron, nicotine, sodium chloride flush, HYDROmorphone **OR** HYDROmorphone, diphenhydrAMINE    Data:     Past Medical History:   has a past medical history of Anxiety, Crohn's disease of ileum ([facility] [address] Depression, Mood disorder ([facility] [address] and Seasonal allergies. Social History:   reports that she has never smoked. She has never used smokeless tobacco. She reports current alcohol use of about 2.0 standard drinks of alcohol per week. She reports that she does not use drugs.      Family History:   Family History   Problem Relation Age of Onset    Bipolar Disorder Mother     Other Mother     Alcohol Abuse Mother     Obesity Father     Hypertension Father     Alcohol Abuse Maternal Aunt     Bipolar Disorder Maternal Grandfather     Diabetes Maternal Grandfather     Alcohol Abuse Maternal Grandfather     High Blood Pressure Maternal Grandfather     Stroke Maternal Grandfather     Other Sister         gastroparesis    No Known Problems Brother     Miscarriages / Stillbirths Neg Hx     Coronary Art Dis Neg Hx     Colon Cancer Neg Hx     Uterine Cancer Neg Hx     Ovarian Cancer Neg Hx     Breast Cancer Neg Hx        Vitals:  /82   Pulse 94   Temp 98.8 °F (37.1 °C) (Oral)   Resp 18   Ht 5' 1\" (1.549 m)   Wt 158 lb 1.6 oz (71.7 kg)   SpO2 97%   BMI 29.87 kg/m²   Temp (24hrs), Av.6 °F (37 °C), Min:98.1 °F (36.7 °C), Max:99 °F (37.2 °C)    No results for input(s): POCGLU in the last 72 hours. I/O (24Hr):     Intake/Output Summary (Last 24 hours) at 2020 07  Last data filed at 2020 2241  Gross per 24 hour   Intake 1682 ml   Output 1000 ml   Net 682 ml       Labs:  Hematology:  Recent Labs     20  0727   WBC 14.4* 9.1   RBC 4.79 4.39   HGB 14.6 13.6   HCT 44.0 41.0   MCV 91.9 93.4   MCH 30.6 31.0   MCHC 33.3 33.2   RDW 12.2* 11.6*    341   MPV 6.8 8.8     Chemistry:  Recent Labs     20  0727    136   K 3.9 4.5    106   CO2 21 19*   GLUCOSE 104* 110*   BUN 12 8   CREATININE 0.60 0.58   ANIONGAP 14 11   LABGLOM >60 >60   GFRAA >60 >60   CALCIUM 9.7 8.9     Recent Labs     20  0843   PROT 7.3  --    LABALBU 4.1  --    TSH  --  0.65   AST 19  --    ALT 14  --    ALKPHOS 38  --    BILITOT 0.40  --    AMYLASE 79  --    LIPASE 29  --      ABG:No results found for: POCPH, PHART, PH, POCPCO2, MJQ5PLS, PCO2, POCPO2, PO2ART, PO2, POCHCO3, ZEJ4OWS, HCO3, NBEA, PBEA, BEART, BE, THGBART, THB, CPM2SPC, WPCT2SLH, A7JNZEHW, O2SAT, FIO2  Lab Results   Component Value Date/Time    SPECIAL NOT REPORTED 2020 03:03 AM     Lab Results   Component Value Date/Time    CULTURE NO SIGNIFICANT GROWTH 2020 03:03 AM       Radiology:  Imelda Meter Acute Abd Series Chest 1 Vw    Result Date: 2020  Multiple loops of moderately distended small bowel with air-fluid levels which could represent small bowel ileus versus early/partial small bowel obstruction. Continued follow-up and further evaluation is recommended. Consider further assessment with contrast-enhanced CT of the abdomen pelvis. Ct Abdomen Pelvis W Iv Contrast Additional Contrast? None    Result Date: 11/26/2020  1. Small-bowel obstruction to the level of the distal jejunum at the site of inflammatory stricture related to the patient's history of Crohn's disease. Associated mesenteric edema and small volume abdominopelvic ascites. No evidence for abscess or fistula. 2.  Separate long segment small bowel inflammation involving the distal and terminal ileum again noted. Physical Examination:       General appearance:  alert, cooperative and no distress  Mental Status:  oriented to person, place and time and normal affect  Lungs:  clear to auscultation bilaterally, normal effort  Heart:  regular rate and rhythm, no murmur  Abdomen:  soft, nontender, rounded, normal bowel sounds, no masses, hepatomegaly, splenomegaly  Extremities:  no edema, redness, tenderness in the calves  Skin:  no gross lesions, rashes, induration    Assessment:     Hospital Problems           Last Modified POA    [facility] small and large intestines [facility] 20/[facility]9652 Yes          Plan:     1. Low fiber diet  2. Entocort daily as ordered  3. Follow-up with GI in 1 to 2 weeks as outpatient  4. Encouraged to follow Crohn's diet restrictions  5. Tachycardia resolved  6. Abdominal pain resolved  7. Increase activity as tolerated    Plan for discharge home today.     SUDHIR Corona NP  11/28/2020  7:32 AM

## 2020-11-28 NOTE — DISCHARGE SUMMARY
abdominal pain started. She then experienced dizziness, lightheadedness, and felt almost syncopal.  Her abdominal pain was severe and she came to the hospital for evaluation. X-ray of the abdomen in the ED revealed multiple loops of moderate distended small bowel with air-fluid levels which were suspected to be small bowel ileus versus early/partial small bowel obstruction. General surgery was called for consult. No surgical intervention required this hospital stay. She was placed n.p.o., started on IV hydration and IV steroid. ET of the abdomen pelvis with contrast was completed and confirmed a small bowel obstruction to the level of the distal jejunum at the site of inflammatory stricture. There was also a separate long segment small bowel inflammation involving the distal and terminal ileum. Her diet was advanced to a low fiber diet and she was given IV Solu-Medrol. She did experience some tachycardia but this resolved with IV fluids and discontinuation of IV steroid. She was discharged home with abdominal pain resolved and given a short course of Entocort. She was instructed to follow-up with her PCP within 1 week and GI in 1 to 2 weeks.         Significant therapeutic interventions:     IV hydration  IV Solu-Medrol  Bowel rest  Antiemetics  Electrolyte replacement    Significant Diagnostic Studies: As above  Labs / Micro:  CBC:   Lab Results   Component Value Date    WBC 9.1 11/27/2020    RBC 4.39 11/27/2020    HGB 13.6 11/27/2020    HCT 41.0 11/27/2020    MCV 93.4 11/27/2020    MCH 31.0 11/27/2020    MCHC 33.2 11/27/2020    RDW 11.6 11/27/2020     11/27/2020     BMP:    Lab Results   Component Value Date    GLUCOSE 110 11/27/2020     11/27/2020    K 4.5 11/27/2020     11/27/2020    CO2 19 11/27/2020    ANIONGAP 11 11/27/2020    BUN 8 11/27/2020    CREATININE 0.58 11/27/2020    BUNCRER NOT REPORTED 11/27/2020    CALCIUM 8.9 11/27/2020    LABGLOM >60 11/27/2020    GFRAA >60 11/27/2020    GFR      11/27/2020    GFR NOT REPORTED 11/27/2020     TSH:    Lab Results   Component Value Date    TSH 0.65 11/26/2020        Radiology:  Xr Acute Abd Series Chest 1 Vw    Result Date: 11/26/2020  Multiple loops of moderately distended small bowel with air-fluid levels which could represent small bowel ileus versus early/partial small bowel obstruction. Continued follow-up and further evaluation is recommended. Consider further assessment with contrast-enhanced CT of the abdomen pelvis. Ct Abdomen Pelvis W Iv Contrast Additional Contrast? None    Result Date: 11/26/2020  1. Small-bowel obstruction to the level of the distal jejunum at the site of inflammatory stricture related to the patient's history of Crohn's disease. Associated mesenteric edema and small volume abdominopelvic ascites. No evidence for abscess or fistula. 2.  Separate long segment small bowel inflammation involving the distal and terminal ileum again noted. Consultations:    Consults:     Final Specialist Recommendations/Findings:   IP CONSULT TO GENERAL SURGERY      The patient was seen and examined on day of discharge and this discharge summary is in conjunction with any daily progress note from day of discharge.     Discharge plan:     Disposition: Home    Physician Follow Up:     Bharat Quinones MD  [address] [place] [address]  [phone]    In 1 week  follow up within 1 week    [address] [place] [address] [place], MD  [address] [place] [address] 36. [phone]    In 10 days  make appointment to be seen in 10 days       Requiring Further Evaluation/Follow Up POST HOSPITALIZATION/Incidental Findings: NA    Diet: low fiber diet/ Crohn's diet    Activity: As tolerated    Instructions to Patient:     Take medications as prescribed    Follow-up with GI as recommended    Follow-up with PCP within 1 week    Follow Crohn's diet restrictions        Discharge Medications:      Medication List START taking these medications    budesonide 3 MG extended release capsule  Commonly known as:  ENTOCORT EC  Take 2 capsules by mouth daily for 5 days        CONTINUE taking these medications    dicyclomine 10 MG capsule  Commonly known as:  Bentyl  1-2 capsules prn     hydrocortisone 2.5 % cream  Apply topically 2 times daily. Jolessa 0.15-0.03 MG per tablet  Generic drug:  levonorgestrel-ethinyl estradiol  TAKE ONE TABLET BY MOUTH DAILY     lamoTRIgine 150 MG tablet  Commonly known as:  LAMICTAL     metroNIDAZOLE 0.75 % cream  Commonly known as:  METROCREAM     MULTIVITAMIN PO     polyethylene glycol 17 g packet  Commonly known as:  GLYCOLAX     PROBIOTIC-10 PO     SODIUM FLUORIDE (DENTAL GEL) 1.1 % Crea     Sulfacetamide Sod-Sulfur Wash 9-4.5 % Kit  Wash face twice daily - can dispense any covered sulfacetamide wash     traMADol 100 MG Tb24 extended release tablet  Commonly known as:  ULTRAM ER  Take 1 tablet by mouth daily as needed for Pain for up to 180 days. The day after Entyvio infusion. vedolizumab 300 MG injection  Commonly known as:  ENTYVIO     vitamin D 50 MCG (2000 UT) Caps capsule  Take 1 capsule by mouth daily           Where to Get Your Medications      These medications were sent to [facility] [phone] - F [phone] [address] [address]    Phone:  [phone]   · budesonide 3 MG extended release capsule  · dicyclomine 10 MG capsule  · vitamin D 50 MCG (2000 UT) Caps capsule         No discharge procedures on file. Time Spent on discharge is  20 mins in patient examination, evaluation, counseling as well as medication reconciliation, prescriptions for required medications, discharge plan and follow up. Electronically signed by   SUDHIR Evans NP  11/28/2020  12:20 PM      Thank you Dr. Hermes Humphrey MD for the opportunity to be involved in this patient's care.

## 2020-11-28 NOTE — PROGRESS NOTES
DC instructions done with patient and her spouse, all questions answered    Patient left with all belongings including laptop computer, cell phone and chargers    Patient ambulated to exit with

## 2020-11-28 NOTE — PLAN OF CARE
Problem: Pain:  Goal: Pain level will decrease  Description: Pain level will decrease  11/28/2020 1153 by Lizbeth Maldonado RN  Outcome: Completed  11/28/2020 0250 by Germaine Bazzi  Outcome: Ongoing     Problem: Pain:  Goal: Control of acute pain  Description: Control of acute pain  11/28/2020 1153 by Lizbeth Maldonado RN  Outcome: Completed  11/28/2020 0250 by Germaine Bazzi  Outcome: Ongoing     Problem: Infection:  Goal: Will remain free from infection  Description: Will remain free from infection  11/28/2020 1153 by Lizbeth Maldonado RN  Outcome: Completed  11/28/2020 0250 by Germaine Bazzi  Outcome: Ongoing     Problem: Safety:  Goal: Free from accidental physical injury  Description: Free from accidental physical injury  11/28/2020 1153 by Lizbeth Maldonado RN  Outcome: Completed  11/28/2020 0250 by Germaine Bazzi  Outcome: Ongoing  Goal: Free from intentional harm  Description: Free from intentional harm  11/28/2020 1153 by Lizbeth Maldonado RN  Outcome: Completed  11/28/2020 0250 by Germaine Bazzi  Outcome: Ongoing

## 2020-11-28 NOTE — PLAN OF CARE
Problem: Safety:  Goal: Free from accidental physical injury  Description: Free from accidental physical injury  Outcome: Ongoing    Problem: Safety:  Goal: Free from intentional harm  Description: Free from intentional harm  Outcome: Ongoing    : Will ensure the safety of the patient and do what is in their best interest in conjunction with their care plan. Problem: Daily Care:  Goal: Daily care needs are met  Description: Daily care needs are met  Outcome: Ongoing     Problem: Discharge Planning:  Goal: Patients continuum of care needs are met  Description: Patients continuum of care needs are met  Outcome: Ongoing    : Will ask open-ended questions to assess patient's understanding of plan of care.     : Patient is in bed with no needs at this time. Call light is within reach.  Will continue to monitor and assess hourly/PRN

## 2020-12-07 RX ORDER — LEVONORGESTREL / ETHINYL ESTRADIOL 0.15-0.03
KIT ORAL
Qty: 91 TABLET | Refills: 0 | Status: SHIPPED | OUTPATIENT
Start: 2020-12-07 | End: 2021-02-23

## 2020-12-07 NOTE — TELEPHONE ENCOUNTER
Tenzin Lanier is calling to request a refill on the following medication(s):    Last Visit Date (If Applicable):  7/05/8018    Next Visit Date:    Visit date not found    Medication Request:  Requested Prescriptions     Pending Prescriptions Disp Refills   Rheta Asp 0.15-0.03 MG per tablet [Pharmacy Med Name: Sancho Cobia 0.15 MG-0.03 MG TABLET] 91 tablet 0     Sig: TAKE ONE TABLET BY MOUTH DAILY

## 2020-12-28 ENCOUNTER — PATIENT MESSAGE (OUTPATIENT)
Dept: FAMILY MEDICINE CLINIC | Age: 25
End: 2020-12-28

## 2020-12-29 RX ORDER — PIMECROLIMUS 10 MG/G
CREAM TOPICAL
Qty: 100 G | Refills: 1 | Status: SHIPPED | OUTPATIENT
Start: 2020-12-29

## 2020-12-29 NOTE — TELEPHONE ENCOUNTER
From: Nicole Lobo  To: Last Joshua MD  Sent: 2020 10:46 PM EST  Subject: Prescription Question    Hi,    It's wintertime, so my eczema is starting to flare up again. When I was going to put on elidel for it, I noticed my current tube  over the summer. I was wondering if I could have a new prescription for that and hydrocortisone called in to my pharmacy? It's a [address] with the address [address]. [place], [address] [facility]. If there's any possibility of this happening before the years over, so I can get it before my deductible resets, I'd appreciate that, I understand if that's not possible though, with such late notice. Thanks!

## 2020-12-30 RX ORDER — TRAMADOL HYDROCHLORIDE 50 MG/1
TABLET ORAL
Qty: 3 TABLET | Refills: 0 | OUTPATIENT
Start: 2020-12-30

## 2020-12-30 NOTE — TELEPHONE ENCOUNTER
Script refused per Dr Morena Smith. Patient should speak with PC for  further Tramadol scripts if needed.

## 2021-01-02 DIAGNOSIS — K50.112 CROHN'S DISEASE OF COLON WITH INTESTINAL OBSTRUCTION (HCC): ICD-10-CM

## 2021-01-04 ENCOUNTER — PATIENT MESSAGE (OUTPATIENT)
Dept: FAMILY MEDICINE CLINIC | Age: 26
End: 2021-01-04

## 2021-01-04 DIAGNOSIS — K50.90 CROHN'S DISEASE WITHOUT COMPLICATION, UNSPECIFIED GASTROINTESTINAL TRACT LOCATION (HCC): Primary | ICD-10-CM

## 2021-01-04 RX ORDER — TRAMADOL HYDROCHLORIDE 50 MG/1
50 TABLET ORAL EVERY 6 HOURS PRN
Qty: 12 TABLET | Refills: 0 | Status: SHIPPED | OUTPATIENT
Start: 2021-01-04 | End: 2021-01-07

## 2021-01-04 RX ORDER — TRAMADOL HYDROCHLORIDE 100 MG/1
100 TABLET, FILM COATED, EXTENDED RELEASE ORAL DAILY PRN
Qty: 1 TABLET | Refills: 2 | OUTPATIENT
Start: 2021-01-04 | End: 2021-07-03

## 2021-01-04 NOTE — TELEPHONE ENCOUNTER
From: Kailash To  To: Ariella Alvarado MD  Sent: 1/4/2021 10:49 AM EST  Subject: Prescription Question    Hi,  Dr. Shonna Richardson had previously prescribed me a small amount of tramadol for use after my Entyvio infusions that I get every two months, as the infusions give me incredible migraines. He had previously prescribed Tramadol HCL 50mg, that i refilled every two months before my infusion. I have an infusion scheduled for this Friday, and he advised that he's referring me to you to get this prescription from now on, and advises Tylenol for the migraines, as I can't take NSAIDs; however I do try with every infusion to manage with just Tylenol and it doesn't help. I was hoping you could prescribe the tramadol with a few refills on it by my infusion on Friday, I only need a small amount per refill, I can usually manage with 2 or 3 pills of 50mg each, and only refill every 2 months. Please let me know if there's anything I need to do. Thanks!

## 2021-01-07 ENCOUNTER — PATIENT MESSAGE (OUTPATIENT)
Dept: FAMILY MEDICINE CLINIC | Age: 26
End: 2021-01-07

## 2021-01-07 NOTE — TELEPHONE ENCOUNTER
From: Elizabeth Ash  To: Maninder Black MD  Sent: 1/7/2021 3:18 PM EST  Subject: Prescription Question    Hi Dr. Dionte Jackson,    Thank you so much for that prescription! With 12 tablets I'm sure that will last me a while. I'm sure Karla updated you that we both have caught covid, for now I'm just having massive headaches and body aches and mild trouble breathing. Unfortunately Tylenol isn't helping with those pains, so I'm trying to keep hydrated and hope that helps. If you have any suggestions, let me know! Thanks!      ----- Message -----   Grecia Grayson MD   Sent:1/4/2021 12:32 PM EST   To:Salina Miranda   Subject:RE: Prescription Question    I called in 12 tabs of the tramadol. Please use if needed. Please give me an update. Thank you.      ----- Message -----   Sameer Garcia   Sent:1/4/2021 10:49 AM EST   Reyes Nani, MD   Subject:Prescription Question    Hi,  Dr. Campbell Dallas had previously prescribed me a small amount of tramadol for use after my Entyvio infusions that I get every two months, as the infusions give me incredible migraines. He had previously prescribed Tramadol HCL 50mg, that i refilled every two months before my infusion. I have an infusion scheduled for this Friday, and he advised that he's referring me to you to get this prescription from now on, and advises Tylenol for the migraines, as I can't take NSAIDs; however I do try with every infusion to manage with just Tylenol and it doesn't help. I was hoping you could prescribe the tramadol with a few refills on it by my infusion on Friday, I only need a small amount per refill, I can usually manage with 2 or 3 pills of 50mg each, and only refill every 2 months. Please let me know if there's anything I need to do. Thanks!

## 2021-01-28 ENCOUNTER — TELEPHONE (OUTPATIENT)
Dept: GASTROENTEROLOGY | Age: 26
End: 2021-01-28

## 2021-01-28 NOTE — LETTER
[facility]  [address]  Phone: [phone]  Fax: [phone]    Sara Mireles MD        February 9, 2021    [address] [place] [address]      Dear Michelle Lora:    In my opinion, patient is cleared to take Focalin in regards to her Crohn's and Entyvio treatment. If you have any questions or concerns, please don't hesitate to call.     Sincerely,          Sara Mireles MD

## 2021-01-28 NOTE — TELEPHONE ENCOUNTER
Patient LM stating psych dr wants to be sure the medication Focalin is ok to take with her Crohn's and Entyvio. Will speak with Dr Gloria Rand.

## 2021-02-23 RX ORDER — LEVONORGESTREL / ETHINYL ESTRADIOL 0.15-0.03
KIT ORAL
Qty: 91 TABLET | Refills: 0 | Status: SHIPPED | OUTPATIENT
Start: 2021-02-23 | End: 2021-04-28

## 2021-04-28 RX ORDER — LEVONORGESTREL / ETHINYL ESTRADIOL 0.15-0.03
KIT ORAL
Qty: 91 TABLET | Refills: 0 | Status: SHIPPED | OUTPATIENT
Start: 2021-04-28

## 2021-04-28 NOTE — TELEPHONE ENCOUNTER
Jhoan Garza is calling to request a refill on the following medication(s):    Last Visit Date (If Applicable):  2/30/1989    Next Visit Date:    Visit date not found    Medication Request:  Requested Prescriptions     Pending Prescriptions Disp Refills   Lorrene Days 0.15-0.03 MG per tablet [Pharmacy Med Name: Melissa Cedeño 0.15 MG-0.03 MG TABLET] 91 tablet 0     Sig: TAKE ONE TABLET BY MOUTH DAILY

## (undated) DEVICE — SYRINGE MED 50ML LUERLOCK TIP

## (undated) DEVICE — TUBING, SUCTION, 1/4" X 12', STRAIGHT: Brand: MEDLINE

## (undated) DEVICE — GOWN,AURORA,NONREINFORCED,LARGE: Brand: MEDLINE

## (undated) DEVICE — BLOCK BITE 60FR RUBBER ADLT DENTAL

## (undated) DEVICE — MEDICINE CUP, GRADUATED, STER: Brand: MEDLINE

## (undated) DEVICE — BASIN EMSIS 700ML GRAPHITE PLAS KID SHP GRAD

## (undated) DEVICE — ADAPTER TBNG LUER STUB 15 GA INTMED

## (undated) DEVICE — Device: Brand: DEFENDO VALVE AND CONNECTOR KIT

## (undated) DEVICE — YANKAUER,FLEXIBLE HANDLE,REGLR CAPACITY: Brand: MEDLINE INDUSTRIES, INC.

## (undated) DEVICE — FORCEPS BX L240CM WRK CHN 2.8MM STD CAP W/ NDL MIC MESH

## (undated) DEVICE — CO2 CANNULA,SUPERSOFT, ADLT,7'O2,7'CO2: Brand: MEDLINE